# Patient Record
Sex: FEMALE | Race: WHITE | Employment: FULL TIME | URBAN - METROPOLITAN AREA
[De-identification: names, ages, dates, MRNs, and addresses within clinical notes are randomized per-mention and may not be internally consistent; named-entity substitution may affect disease eponyms.]

---

## 2017-02-17 NOTE — TELEPHONE ENCOUNTER
Recevied request for refill from [facility] for [place]esta Thyroid would you like to refill

## 2017-02-19 RX ORDER — LEVOTHYROXINE AND LIOTHYRONINE 38; 9 UG/1; UG/1
TABLET ORAL
Qty: 90 TAB | Refills: 3 | Status: SHIPPED | OUTPATIENT
Start: 2017-02-19 | End: 2021-10-25

## 2017-02-23 ENCOUNTER — TELEPHONE (OUTPATIENT)
Dept: NEUROLOGY | Age: 47
End: 2017-02-23

## 2017-02-24 NOTE — TELEPHONE ENCOUNTER
Called the patient back and asked what pharm she wanted the scipt to go to, patient advised that she wanted it to go to [facility] on [address] the number provided was [phone]

## 2017-04-07 RX ORDER — ESOMEPRAZOLE MAGNESIUM 40 MG/1
CAPSULE, DELAYED RELEASE ORAL
Refills: 5 | COMMUNITY
Start: 2017-03-11 | End: 2017-04-07 | Stop reason: SDUPTHER

## 2017-04-07 NOTE — TELEPHONE ENCOUNTER
No future appointments.                           Please refill below    Requested Prescriptions     Pending Prescriptions Disp Refills    esomeprazole (NEXIUM) 40 mg capsule 30 Cap 5     Sig: Take one capsule by mouth every day

## 2017-04-10 RX ORDER — ESOMEPRAZOLE MAGNESIUM 40 MG/1
CAPSULE, DELAYED RELEASE ORAL
Qty: 30 CAP | Refills: 5 | Status: SHIPPED | OUTPATIENT
Start: 2017-04-10 | End: 2017-04-13 | Stop reason: SDUPTHER

## 2017-04-13 NOTE — TELEPHONE ENCOUNTER
No future appointments.                       Last Appointment My Department:  Visit date not found    Please refill     Requested Prescriptions     Pending Prescriptions Disp Refills    esomeprazole (NEXIUM) 40 mg capsule 30 Cap 5     Sig: Take one capsule by mouth every day

## 2017-04-14 RX ORDER — ESOMEPRAZOLE MAGNESIUM 40 MG/1
CAPSULE, DELAYED RELEASE ORAL
Qty: 30 CAP | Refills: 5 | Status: SHIPPED | OUTPATIENT
Start: 2017-04-14 | End: 2018-03-30 | Stop reason: SDUPTHER

## 2017-04-26 ENCOUNTER — OFFICE VISIT (OUTPATIENT)
Dept: NEUROLOGY | Age: 47
End: 2017-04-26

## 2017-04-26 VITALS
WEIGHT: 209 LBS | OXYGEN SATURATION: 98 % | HEART RATE: 94 BPM | DIASTOLIC BLOOD PRESSURE: 72 MMHG | SYSTOLIC BLOOD PRESSURE: 110 MMHG

## 2017-04-26 DIAGNOSIS — Z51.81 ANTICOAGULATION GOAL OF INR 2.0 TO 2.5: ICD-10-CM

## 2017-04-26 DIAGNOSIS — Z79.01 ANTICOAGULATION GOAL OF INR 2.0 TO 2.5: ICD-10-CM

## 2017-04-26 DIAGNOSIS — I77.74 VERTEBRAL ARTERY DISSECTION (HCC): Primary | ICD-10-CM

## 2017-04-26 DIAGNOSIS — E03.9 ACQUIRED HYPOTHYROIDISM: ICD-10-CM

## 2017-04-26 DIAGNOSIS — G25.81 RESTLESS LEG SYNDROME: ICD-10-CM

## 2017-04-26 RX ORDER — HYDROCODONE BITARTRATE AND ACETAMINOPHEN 10; 325 MG/1; MG/1
1 TABLET ORAL
Qty: 20 TAB | Refills: 0 | Status: SHIPPED | OUTPATIENT
Start: 2017-04-26 | End: 2017-09-06 | Stop reason: ALTCHOICE

## 2017-04-26 RX ORDER — VENLAFAXINE HYDROCHLORIDE 150 MG/1
CAPSULE, EXTENDED RELEASE ORAL DAILY
COMMUNITY
End: 2018-06-01 | Stop reason: SDUPTHER

## 2017-04-26 RX ORDER — ATORVASTATIN CALCIUM 40 MG/1
TABLET, FILM COATED ORAL DAILY
COMMUNITY
End: 2017-07-13 | Stop reason: SDUPTHER

## 2017-04-26 RX ORDER — WARFARIN SODIUM 5 MG/1
8 TABLET ORAL DAILY
COMMUNITY

## 2017-04-26 NOTE — MR AVS SNAPSHOT
Visit Information Date & Time Provider Department Dept. Phone Encounter #  
 4/26/2017  9:40 AM Estela Dickey MD [facility] [phone] [id number] Follow-up Instructions Return in about 4 months (around 8/26/2017). Upcoming Health Maintenance Date Due DTaP/Tdap/Td series (1 - Tdap) 7/15/1991 PAP AKA CERVICAL CYTOLOGY 7/15/1991 INFLUENZA AGE 9 TO ADULT 8/1/2016 Allergies as of 4/26/2017  Review Complete On: 4/26/2017 By: Karo Rivers Not on File Current Immunizations  Never Reviewed No immunizations on file. Not reviewed this visit You Were Diagnosed With   
  
 Codes Comments Vertebral artery dissection (HCC)    -  Primary ICD-10-CM: I77.74 ICD-9-CM: 443.24 Restless leg syndrome     ICD-10-CM: G25.81 ICD-9-CM: 333.94 Acquired hypothyroidism     ICD-10-CM: E03.9 ICD-9-CM: 244.9 Anticoagulation goal of INR 2.0 to 2.5     ICD-10-CM: Z51.81, Z79.01 
ICD-9-CM: V58.83, V58.61 Vitals BP Pulse Weight(growth percentile) SpO2 Smoking Status 110/72 94 209 lb (94.8 kg) 98% Current Some Day Smoker Preferred Pharmacy Pharmacy Name Phone [id number] [address] [phone] Your Updated Medication List  
  
   
This list is accurate as of: 4/26/17 10:44 AM.  Always use your most recent med list.  
  
  
  
  
 EFFEXOR  mg capsule Generic drug:  venlafaxine-SR Take  by mouth daily. esomeprazole 40 mg capsule Commonly known as:  Lizzy Leyva Take one capsule by mouth every day  
  
 * HYDROcodone-acetaminophen  mg tablet Commonly known as:  [place] Take 1 Tab by mouth nightly as needed for Pain. Max Daily Amount: 1 Tab. * HYDROcodone-acetaminophen  mg tablet Commonly known as:  [place] Take 1 Tab by mouth nightly as needed for Pain Leonides Vidales on May 26 2017).  Max Daily Amount: 1 Tab. * HYDROcodone-acetaminophen  mg tablet Commonly known as:  [place] L[place] Take 1 Tab by mouth nightly as needed for Pain Augustus Franck on June 25 2017). Max Daily Amount: 1 Tab. LIPITOR 40 mg tablet Generic drug:  atorvastatin Take  by mouth daily. thyroid (Pork) 60 mg tablet Commonly known as:  ARMOUR THYROID Take 2 and 1/2 tablets by mouth in the morning  
  
 warfarin 5 mg tablet Commonly known as:  COUMADIN Take 5 mg by mouth daily. * Notice: This list has 3 medication(s) that are the same as other medications prescribed for you. Read the directions carefully, and ask your doctor or other care provider to review them with you. Prescriptions Printed Refills HYDROcodone-acetaminophen (NORCO)  mg tablet 0 Sig: Take 1 Tab by mouth nightly as needed for Pain. Max Daily Amount: 1 Tab. Class: Print Route: Oral  
 HYDROcodone-acetaminophen (NORCO)  mg tablet 0 Sig: Take 1 Tab by mouth nightly as needed for Pain Augustus Franck on May 26 2017). Max Daily Amount: 1 Tab. Class: Print Route: Oral  
 HYDROcodone-acetaminophen (NORCO)  mg tablet 0 Sig: Take 1 Tab by mouth nightly as needed for Pain Augustus Franck on June 25 2017). Max Daily Amount: 1 Tab. Class: Print Route: Oral  
  
Follow-up Instructions Return in about 4 months (around 8/26/2017). Patient Instructions A Healthy Lifestyle: Care Instructions Your Care Instructions A healthy lifestyle can help you feel good, stay at a healthy weight, and have plenty of energy for both work and play. A healthy lifestyle is something you can share with your whole family. A healthy lifestyle also can lower your risk for serious health problems, such as high blood pressure, heart disease, and diabetes. You can follow a few steps listed below to improve your health and the health of your family. Follow-up care is a key part of your treatment and safety. Be sure to make and go to all appointments, and call your doctor if you are having problems. Its also a good idea to know your test results and keep a list of the medicines you take. How can you care for yourself at home? · Do not eat too much sugar, fat, or fast foods. You can still have dessert and treats now and then. The goal is moderation. · Start small to improve your eating habits. Pay attention to portion sizes, drink less juice and soda pop, and eat more fruits and vegetables. ¨ Eat a healthy amount of food. A 3-ounce serving of meat, for example, is about the size of a deck of cards. Fill the rest of your plate with vegetables and whole grains. ¨ Limit the amount of soda and sports drinks you have every day. Drink more water when you are thirsty. ¨ Eat at least 5 servings of fruits and vegetables every day. It may seem like a lot, but it is not hard to reach this goal. A serving or helping is 1 piece of fruit, 1 cup of vegetables, or 2 cups of leafy, raw vegetables. Have an apple or some carrot sticks as an afternoon snack instead of a candy bar. Try to have fruits and/or vegetables at every meal. 
· Make exercise part of your daily routine. You may want to start with simple activities, such as walking, bicycling, or slow swimming. Try to be active 30 to 60 minutes every day. You do not need to do all 30 to 60 minutes all at once. For example, you can exercise 3 times a day for 10 or 20 minutes. Moderate exercise is safe for most people, but it is always a good idea to talk to your doctor before starting an exercise program. 
· Keep moving. [place]aca Sauce the lawn, work in the garden, or LOVEFiLM. Take the stairs instead of the elevator at work. · If you smoke, quit. People who smoke have an increased risk for heart attack, stroke, cancer, and other lung illnesses.  Quitting is hard, but there are ways to boost your chance of quitting tobacco for good. ¨ Use nicotine gum, patches, or lozenges. ¨ Ask your doctor about stop-smoking programs and medicines. ¨ Keep trying. In addition to reducing your risk of diseases in the future, you will notice some benefits soon after you stop using tobacco. If you have shortness of breath or asthma symptoms, they will likely get better within a few weeks after you quit. · Limit how much alcohol you drink. Moderate amounts of alcohol (up to 2 drinks a day for men, 1 drink a day for women) are okay. But drinking too much can lead to liver problems, high blood pressure, and other health problems. Family health If you have a family, there are many things you can do together to improve your health. · Eat meals together as a family as often as possible. · Eat healthy foods. This includes fruits, vegetables, lean meats and dairy, and whole grains. · Include your family in your fitness plan. Most people think of activities such as jogging or tennis as the way to fitness, but there are many ways you and your family can be more active. Anything that makes you breathe hard and gets your heart pumping is exercise. Here are some tips: 
¨ Walk to do errands or to take your child to school or the bus. ¨ Go for a family bike ride after dinner instead of watching TV. Where can you learn more? Go to http://elsa-sarah.info/. Enter G216 in the search box to learn more about \"A Healthy Lifestyle: Care Instructions. \" Current as of: July 26, 2016 Content Version: 11.2 © 1701-4088 [employer]. Care instructions adapted under license by [employer] (which disclaims liability or warranty for this information). If you have questions about a medical condition or this instruction, always ask your healthcare professional. [place] [address] any warranty or liability for your use of this information. Introducing [facility]! Bastian Part introduces [facility] patient portal. Now you can access parts of your medical record, email your doctor's office, and request medication refills online. 1. In your internet browser, go to [email or web address] 2. Click on the First Time User? Click Here link in the Sign In box. You will see the New Member Sign Up page. 3. Enter your [employer] Access Code exactly as it appears below. You will not need to use this code after youve completed the sign-up process. If you do not sign up before the expiration date, you must request a new code. · [facility] Access Code: [email or web address] Expires: 7/25/2017  9:17 AM 
 
4. Enter the last four digits of your Social Security Number (xxxx) and Date of Birth (mm/dd/yyyy) as indicated and click Submit. You will be taken to the next sign-up page. 5. Create a [facility] ID. This will be your [facility] login ID and cannot be changed, so think of one that is secure and easy to remember. 6. Create a [employer] password. You can change your password at any time. 7. Enter your Password Reset Question and Answer. This can be used at a later time if you forget your password. 8. Enter your e-mail address. You will receive e-mail notification when new information is available in [address] 9. Click Sign Up. You can now view and download portions of your medical record. 10. Click the Download Summary menu link to download a portable copy of your medical information. If you have questions, please visit the Frequently Asked Questions section of the [employer] website. Remember, [facility] is NOT to be used for urgent needs. For medical emergencies, dial 911. Now available from your iPhone and Android! Please provide this summary of care documentation to your next provider. Your primary care clinician is listed as PROVIDER UNKNOWN. If you have any questions after today's visit, please call [phone].
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
## 2017-04-26 NOTE — PATIENT INSTRUCTIONS

## 2017-06-09 RX ORDER — VENLAFAXINE HYDROCHLORIDE 150 MG/1
CAPSULE, EXTENDED RELEASE ORAL DAILY
OUTPATIENT
Start: 2017-06-09

## 2017-06-09 RX ORDER — VENLAFAXINE HYDROCHLORIDE 150 MG/1
1 TABLET, EXTENDED RELEASE ORAL DAILY
Qty: 30 TAB | Refills: 11 | Status: SHIPPED | OUTPATIENT
Start: 2017-06-09 | End: 2017-09-06 | Stop reason: ALTCHOICE

## 2017-07-13 ENCOUNTER — TELEPHONE (OUTPATIENT)
Dept: NEUROLOGY | Age: 47
End: 2017-07-13

## 2017-07-13 DIAGNOSIS — E78.01 FAMILIAL HYPERCHOLESTEROLEMIA: Primary | ICD-10-CM

## 2017-07-13 RX ORDER — ATORVASTATIN CALCIUM 40 MG/1
40 TABLET, FILM COATED ORAL DAILY
Qty: 30 TAB | Refills: 5 | Status: SHIPPED | COMMUNITY
Start: 2017-07-13 | End: 2018-01-15 | Stop reason: SDUPTHER

## 2017-07-13 NOTE — TELEPHONE ENCOUNTER
Requested Prescriptions     Pending Prescriptions Disp Refills    atorvastatin (LIPITOR) 40 mg tablet       Sig: Take  by mouth daily.      Future Appointments  Date Time Provider Kp Sarmiento   9/6/2017 9:40 AM Lore Argueta MD [address]                         Last Appointment My Department:  4/26/2017    Please refill

## 2017-09-06 ENCOUNTER — OFFICE VISIT (OUTPATIENT)
Dept: NEUROLOGY | Age: 47
End: 2017-09-06

## 2017-09-06 VITALS
OXYGEN SATURATION: 99 % | HEART RATE: 83 BPM | DIASTOLIC BLOOD PRESSURE: 64 MMHG | WEIGHT: 213 LBS | BODY MASS INDEX: 39.2 KG/M2 | HEIGHT: 62 IN | SYSTOLIC BLOOD PRESSURE: 110 MMHG

## 2017-09-06 DIAGNOSIS — I10 ESSENTIAL HYPERTENSION: ICD-10-CM

## 2017-09-06 DIAGNOSIS — Z79.01 CHRONIC ANTICOAGULATION: ICD-10-CM

## 2017-09-06 DIAGNOSIS — N80.9 ENDOMETRIOSIS: ICD-10-CM

## 2017-09-06 DIAGNOSIS — M62.838 MUSCLE SPASM: ICD-10-CM

## 2017-09-06 DIAGNOSIS — D50.0 IRON DEFICIENCY ANEMIA DUE TO CHRONIC BLOOD LOSS: ICD-10-CM

## 2017-09-06 DIAGNOSIS — F41.1 GENERALIZED ANXIETY DISORDER: ICD-10-CM

## 2017-09-06 DIAGNOSIS — I77.74 VERTEBRAL ARTERY DISSECTION (HCC): Primary | ICD-10-CM

## 2017-09-06 DIAGNOSIS — I63.9 CEREBELLAR STROKE (HCC): ICD-10-CM

## 2017-09-06 RX ORDER — TIZANIDINE 4 MG/1
4 TABLET ORAL
Qty: 90 TAB | Refills: 3 | Status: SHIPPED | OUTPATIENT
Start: 2017-09-06 | End: 2019-07-26 | Stop reason: SDUPTHER

## 2017-09-06 NOTE — MR AVS SNAPSHOT
Visit Information Date & Time Provider Department Dept. Phone Encounter #  
 9/6/2017  9:40 AM Yasmeen Eli MD [facility] at [facility] [phone] [id number] Follow-up Instructions Return in about 4 months (around 1/6/2018) for STROKE. Upcoming Health Maintenance Date Due Pneumococcal 19-64 Medium Risk (1 of 1 - PPSV23) 7/15/1989 DTaP/Tdap/Td series (1 - Tdap) 7/15/1991 PAP AKA CERVICAL CYTOLOGY 7/15/1991 INFLUENZA AGE 9 TO ADULT 8/1/2017 Allergies as of 9/6/2017  Review Complete On: 9/6/2017 By: Marleni Mosquera Not on File Current Immunizations  Never Reviewed No immunizations on file. Not reviewed this visit You Were Diagnosed With   
  
 Codes Comments Vertebral artery dissection (HCC)    -  Primary ICD-10-CM: I77.74 ICD-9-CM: 443.24 Chronic anticoagulation     ICD-10-CM: Z79.01 
ICD-9-CM: V58.61 Endometriosis     ICD-10-CM: N80.9 ICD-9-CM: 617.9 Cerebellar stroke ([facility] [address]     ICD-10-CM: I63.9 ICD-9-CM: 434.91 Iron deficiency anemia due to chronic blood loss     ICD-10-CM: D50.0 ICD-9-CM: 280.0 Essential hypertension     ICD-10-CM: I10 
ICD-9-CM: 401.9 Generalized anxiety disorder     ICD-10-CM: F41.1 ICD-9-CM: 300.02 Muscle spasm     ICD-10-CM: A39.255 ICD-9-CM: 728.85 Vitals BP Pulse Height(growth percentile) Weight(growth percentile) SpO2 BMI  
 110/64 83 5' 2\" (1.575 m) 213 lb (96.6 kg) 99% 38.96 kg/m2 Smoking Status Current Some Day Smoker Vitals History BMI and BSA Data Body Mass Index Body Surface Area  
 38.96 kg/m 2 2.06 m 2 Preferred Pharmacy Pharmacy Name Phone [id number], [address] [place] [address] [phone] Your Updated Medication List  
  
   
This list is accurate as of: 9/6/17 11:04 AM.  Always use your most recent med list.  
  
  
  
  
 atorvastatin 40 mg tablet Commonly known as:  LIPITOR Take 1 Tab by mouth daily. EFFEXOR  mg capsule Generic drug:  venlafaxine-SR Take  by mouth daily. esomeprazole 40 mg capsule Commonly known as:  Sonda [place] Take one capsule by mouth every day  
  
 thyroid (Pork) 60 mg tablet Commonly known as:  ARMOUR THYROID Take 2 and 1/2 tablets by mouth in the morning  
  
 tiZANidine 4 mg tablet Commonly known as:  Jaunita [place] Take 1 Tab by mouth three (3) times daily as needed. warfarin 5 mg tablet Commonly known as:  COUMADIN Take 5 mg by mouth daily. Prescriptions Sent to Pharmacy Refills  
 tiZANidine (ZANAFLEX) 4 mg tablet 3 Sig: Take 1 Tab by mouth three (3) times daily as needed. Class: Normal  
 Pharmacy: [facility] #[id number] [address] #: [phone] Route: Oral  
  
Follow-up Instructions Return in about 4 months (around 1/6/2018) for STROKE. Patient Instructions A Healthy Lifestyle: Care Instructions Your Care Instructions A healthy lifestyle can help you feel good, stay at a healthy weight, and have plenty of energy for both work and play. A healthy lifestyle is something you can share with your whole family. A healthy lifestyle also can lower your risk for serious health problems, such as high blood pressure, heart disease, and diabetes. You can follow a few steps listed below to improve your health and the health of your family. Follow-up care is a key part of your treatment and safety. Be sure to make and go to all appointments, and call your doctor if you are having problems. Its also a good idea to know your test results and keep a list of the medicines you take. How can you care for yourself at home? · Do not eat too much sugar, fat, or fast foods. You can still have dessert and treats now and then. The goal is moderation. · Start small to improve your eating habits. Pay attention to portion sizes, drink less juice and soda pop, and eat more fruits and vegetables. ¨ Eat a healthy amount of food. A 3-ounce serving of meat, for example, is about the size of a deck of cards. Fill the rest of your plate with vegetables and whole grains. ¨ Limit the amount of soda and sports drinks you have every day. Drink more water when you are thirsty. ¨ Eat at least 5 servings of fruits and vegetables every day. It may seem like a lot, but it is not hard to reach this goal. A serving or helping is 1 piece of fruit, 1 cup of vegetables, or 2 cups of leafy, raw vegetables. Have an apple or some carrot sticks as an afternoon snack instead of a candy bar. Try to have fruits and/or vegetables at every meal. 
· Make exercise part of your daily routine. You may want to start with simple activities, such as walking, bicycling, or slow swimming. Try to be active 30 to 60 minutes every day. You do not need to do all 30 to 60 minutes all at once. For example, you can exercise 3 times a day for 10 or 20 minutes. Moderate exercise is safe for most people, but it is always a good idea to talk to your doctor before starting an exercise program. 
· Keep moving. Harjeet Handsome the lawn, work in the garden, or [employer]. Take the stairs instead of the elevator at work. · If you smoke, quit. People who smoke have an increased risk for heart attack, stroke, cancer, and other lung illnesses. Quitting is hard, but there are ways to boost your chance of quitting tobacco for good. ¨ Use nicotine gum, patches, or lozenges. ¨ Ask your doctor about stop-smoking programs and medicines. ¨ Keep trying. In addition to reducing your risk of diseases in the future, you will notice some benefits soon after you stop using tobacco. If you have shortness of breath or asthma symptoms, they will likely get better within a few weeks after you quit. · Limit how much alcohol you drink. Moderate amounts of alcohol (up to 2 drinks a day for men, 1 drink a day for women) are okay. But drinking too much can lead to liver problems, high blood pressure, and other health problems. Family health If you have a family, there are many things you can do together to improve your health. · Eat meals together as a family as often as possible. · Eat healthy foods. This includes fruits, vegetables, lean meats and dairy, and whole grains. · Include your family in your fitness plan. Most people think of activities such as jogging or tennis as the way to fitness, but there are many ways you and your family can be more active. Anything that makes you breathe hard and gets your heart pumping is exercise. Here are some tips: 
¨ Walk to do errands or to take your child to school or the bus. ¨ Go for a family bike ride after dinner instead of watching TV. Where can you learn more? Go to http://elsa-sarah.info/. Enter R555 in the search box to learn more about \"A Healthy Lifestyle: Care Instructions. \" Current as of: July 26, 2016 Content Version: 11.3 © 4199-8778 [employer]. Care instructions adapted under license by [employer] (which disclaims liability or warranty for this information). If you have questions about a medical condition or this instruction, always ask your healthcare professional. [place] [address] any warranty or liability for your use of this information. PRESCRIPTION REFILL POLICY [facility] Statement to Patients April 1, 2014 In an effort to ensure the large volume of patient prescription refills is processed in the most efficient and expeditious manner, we are asking our patients to assist us by calling your Pharmacy for all prescription refills, this will include also your  Mail Order Pharmacy.  The pharmacy will contact our office electronically to continue the refill process. Please do not wait until the last minute to call your pharmacy. We need at least 48 hours (2days) to fill prescriptions. We also encourage you to call your pharmacy before going to  your prescription to make sure it is ready. With regard to controlled substance prescription refill requests (narcotic refills) that need to be picked up at our office, we ask your cooperation by providing us with at least 72 hours (3days) notice that you will need a refill. We will not refill narcotic prescription refill requests after 4:00pm on any weekday, Monday through Thursday, or after 2:00pm on Fridays, or on the weekends. We encourage everyone to explore another way of getting your prescription refill request processed using "[employer] our patient web portal through our electronic medical record system. "[employer] is an efficient and effective way to communicate your medication request directly to the office and  downloadable as an bhavin on your smart phone . "[employer] also features a review functionality that allows you to view your medication list as well as leave messages for your physician. Are you ready to get connected? If so please review the attatched instructions or speak to any of our staff to get you set up right away! Thank you so much for your cooperation. Should you have any questions please contact our Practice Administrator. The Physicians and Staff,  [facility]! [facility] introduces "[employer] patient portal. Now you can access parts of your medical record, email your doctor's office, and request medication refills online. 1. In your internet browser, go to [email or web address] 2U/[employer] 2. Click on the First Time User? Click Here link in the Sign In box. You will see the New Member Sign Up page. 3. Enter your Max-Viz Access Code exactly as it appears below. You will not need to use this code after youve completed the sign-up process. If you do not sign up before the expiration date, you must request a new code. · Max-Viz Access Code: [email or web address] Expires: 12/5/2017 11:00 AM 
 
4. Enter the last four digits of your Social Security Number (xxxx) and Date of Birth (mm/dd/yyyy) as indicated and click Submit. You will be taken to the next sign-up page. 5. Create a Max-Viz ID. This will be your Max-Viz login ID and cannot be changed, so think of one that is secure and easy to remember. 6. Create a Max[employer] password. You can change your password at any time. 7. Enter your Password Reset Question and Answer. This can be used at a later time if you forget your password. 8. Enter your e-mail address. You will receive e-mail notification when new information is available in [address]. 9. Click Sign Up. You can now view and download portions of your medical record. 10. Click the Download Summary menu link to download a portable copy of your medical information. If you have questions, please visit the Frequently Asked Questions section of the [employer] website. Remember, [employer] is NOT to be used for urgent needs. For medical emergencies, dial 911. Now available from your iPhone and Android! Please provide this summary of care documentation to your next provider. Your primary care clinician is listed as PROVIDER UNKNOWN. If you have any questions after today's visit, please call [phone].

## 2017-09-06 NOTE — PROGRESS NOTES
Chief Complaint: Vertebral artery dissection    Returns for followup. Compliant with medications. The new weakness or sensory loss. No new dizziness. Continues to suffer from restless leg syndrome but this is related to low ferritin levels. She is scheduled to see the hematologist in the near future. Has not suffered any excessive bleeding we discussed the prospect of getting a hysterectomy on account of her heavy periods discussed the risks involved and potential benefits and after weighing these decided against surgery. Assesment and Plan  1. Vertebral artery dissection (HCC)  Continue Coumadin and maintain INR between 2.5 and 3.5.    2. Restless leg syndrome  Hydrocodone and a trial of Horizant    3. Acquired hypothyroidism  Continue Cedar Thyroid    4. Anticoagulation goal of INR 2.0 to 2.5  Continue Coumadin. 5. Hyperlipidemia  Continue lipitor. Allergies  Review of patient's allergies indicates not on file. Medications  Current Outpatient Prescriptions   Medication Sig    atorvastatin (LIPITOR) 40 mg tablet Take 1 Tab by mouth daily.  Venlafaxine 150 mg tr24 Take 1 Tab by mouth daily.  venlafaxine-SR (EFFEXOR XR) 150 mg capsule Take  by mouth daily.  warfarin (COUMADIN) 5 mg tablet Take 5 mg by mouth daily.  esomeprazole (NEXIUM) 40 mg capsule Take one capsule by mouth every day    thyroid, Pork, (ARMOUR THYROID) 60 mg tablet Take 2 and 1/2 tablets by mouth in the morning    HYDROcodone-acetaminophen (NORCO)  mg tablet Take 1 Tab by mouth nightly as needed for Pain. Max Daily Amount: 1 Tab.  HYDROcodone-acetaminophen (NORCO)  mg tablet Take 1 Tab by mouth nightly as needed for Pain Kali Press on May 26 2017). Max Daily Amount: 1 Tab.  HYDROcodone-acetaminophen (NORCO)  mg tablet Take 1 Tab by mouth nightly as needed for Pain Kali Press on June 25 2017). Max Daily Amount: 1 Tab.  gabapentin enacarbil (HORIZANT) 600 mg TbER Take 600 mg by mouth nightly.      No current facility-administered medications for this visit. Medical History  Past Medical History:   Diagnosis Date    Anemia     Anxiety disorder     Endometriosis 06/12/2017    Hearing reduced     Joint pain     Memory disorder     Migraine 2008    Nausea & vomiting     Poor appetite     Snoring     Stroke [facility]) 2008    Thyroid disease 1988       Review of Systems   Constitutional: Positive for malaise/fatigue. Negative for chills and fever. HENT: Negative for ear pain. Eyes: Negative for pain and discharge. Respiratory: Negative for cough and hemoptysis. Cardiovascular: Negative for chest pain and claudication. Gastrointestinal: Negative for constipation and diarrhea. Genitourinary: Negative for flank pain and hematuria. Musculoskeletal: Positive for myalgias. Negative for back pain. Skin: Negative for itching and rash. Neurological: Negative for headaches. Endo/Heme/Allergies: Negative for environmental allergies. Does not bruise/bleed easily. Psychiatric/Behavioral: Negative for depression and hallucinations. The patient is nervous/anxious and has insomnia. Exam:    Visit Vitals    /64    Pulse 83    Ht 5' 2\" (1.575 m)    Wt 213 lb (96.6 kg)    SpO2 99%    BMI 38.96 kg/m2      Gen Appearance: Well built no apparent distress  HEENT : Normocephalic atraumatic with anicteric sclera  Neck: Supple with no thyromegaly   Chest: Clear to auscultation, no wheezes or rubs  CardioVascular: Regular in rhythm and rate with no murmurs  Carotids no bruit  No pedal edema  Abdomen: Soft non tender, distended with normal bowel sounds  Ext: Full range of motion  Skin: Supple, No rash    Neuro:  Awake alert and oriented to person and place and time  Recent and remote memory is intact  Speech: No aphasia and no dysarthria, intact repetition.    Cranial Nerves: Symmetric facial movement  Intact facial sensation  Hearing intact bilaterally, No nystagmus  Intact bilateral gag reflex  Shoulder shrug is symmetric with no weakness  Tongue midline on protrusion  Eyes: Full bilateral visual fields by confrontation. PERRLA EOMI   Motor: 5/5 in all major muscle groups   No tremors  Normal tone, no cogwheel rigidity  Reflexes: 2+ over biceps, triceps and brachioradialis tendons    3+ over the patellar and achilles tendons  Sensory: Intact to all modalities in both proximal and distal distributions  Coordination: Finger to nose and heel over shin to knee intact on both sides  Gait: Well balanced with normal arm swing    Imaging    CT Results (most recent):    Results from Hospital Encounter encounter on 11/16/11   CT HEAD WITHOUT CONTRAST   Narrative **Final Report**      ICD Codes / Adm. Diagnosis: 780.4   / VERTIGO    Examination:  CT HEAD  CON  - [id number] - Nov 16 2011 11:11AM  Accession No:  [id number]  Reason:  vertigo      REPORT:  EXAM:  CT HEAD WITHOUT CONTRAST    INDICATION: Vertigo. COMPARISON: None. CONTRAST: None. TECHNIQUE: Unenhanced CT of the head was performed using 5 mm images. Brain   and bone windows were generated. FINDINGS:  The ventricles and sulci are normal in size, shape and configuration and   midline. There is no significant white matter disease. There is no   intracranial hemorrhage, extra-axial collection, mass, mass effect or   midline shift. The basilar cisterns are open. No acute infarct is   identified. The bone windows demonstrate no abnormalities. The visualized   portions of the paranasal sinuses and mastoid air cells are clear. IMPRESSION: Normal unenhanced CT examination of the head.            Signing/Reading Doctor: Lauren Mcclure ([id number])    Approved: Lauren Mcclure ([id number])  11/16/2011

## 2017-09-06 NOTE — PATIENT INSTRUCTIONS
A Healthy Lifestyle: Care Instructions  Your Care Instructions  A healthy lifestyle can help you feel good, stay at a healthy weight, and have plenty of energy for both work and play. A healthy lifestyle is something you can share with your whole family. A healthy lifestyle also can lower your risk for serious health problems, such as high blood pressure, heart disease, and diabetes. You can follow a few steps listed below to improve your health and the health of your family. Follow-up care is a key part of your treatment and safety. Be sure to make and go to all appointments, and call your doctor if you are having problems. Its also a good idea to know your test results and keep a list of the medicines you take. How can you care for yourself at home? · Do not eat too much sugar, fat, or fast foods. You can still have dessert and treats now and then. The goal is moderation. · Start small to improve your eating habits. Pay attention to portion sizes, drink less juice and soda pop, and eat more fruits and vegetables. ¨ Eat a healthy amount of food. A 3-ounce serving of meat, for example, is about the size of a deck of cards. Fill the rest of your plate with vegetables and whole grains. ¨ Limit the amount of soda and sports drinks you have every day. Drink more water when you are thirsty. ¨ Eat at least 5 servings of fruits and vegetables every day. It may seem like a lot, but it is not hard to reach this goal. A serving or helping is 1 piece of fruit, 1 cup of vegetables, or 2 cups of leafy, raw vegetables. Have an apple or some carrot sticks as an afternoon snack instead of a candy bar. Try to have fruits and/or vegetables at every meal.  · Make exercise part of your daily routine. You may want to start with simple activities, such as walking, bicycling, or slow swimming. Try to be active 30 to 60 minutes every day. You do not need to do all 30 to 60 minutes all at once.  For example, you can exercise 3 times a day for 10 or 20 minutes. Moderate exercise is safe for most people, but it is always a good idea to talk to your doctor before starting an exercise program.  · Keep moving. Hedy Matters the lawn, work in the garden, or [employer]. Take the stairs instead of the elevator at work. · If you smoke, quit. People who smoke have an increased risk for heart attack, stroke, cancer, and other lung illnesses. Quitting is hard, but there are ways to boost your chance of quitting tobacco for good. ¨ Use nicotine gum, patches, or lozenges. ¨ Ask your doctor about stop-smoking programs and medicines. ¨ Keep trying. In addition to reducing your risk of diseases in the future, you will notice some benefits soon after you stop using tobacco. If you have shortness of breath or asthma symptoms, they will likely get better within a few weeks after you quit. · Limit how much alcohol you drink. Moderate amounts of alcohol (up to 2 drinks a day for men, 1 drink a day for women) are okay. But drinking too much can lead to liver problems, high blood pressure, and other health problems. Family health  If you have a family, there are many things you can do together to improve your health. · Eat meals together as a family as often as possible. · Eat healthy foods. This includes fruits, vegetables, lean meats and dairy, and whole grains. · Include your family in your fitness plan. Most people think of activities such as jogging or tennis as the way to fitness, but there are many ways you and your family can be more active. Anything that makes you breathe hard and gets your heart pumping is exercise. Here are some tips:  ¨ Walk to do errands or to take your child to school or the bus. ¨ Go for a family bike ride after dinner instead of watching TV. Where can you learn more? Go to http://elsa-sarah.info/. Enter M727 in the search box to learn more about \"A Healthy Lifestyle: Care Instructions. \"  Current as of: July 26, 2016  Content Version: 11.3  © 0163-1248 [employer]. Care instructions adapted under license by [employer] (which disclaims liability or warranty for this information). If you have questions about a medical condition or this instruction, always ask your healthcare professional. [address] any warranty or liability for your use of this information. [facility]   Statement to Patients  April 1, 2014      In an effort to ensure the large volume of patient prescription refills is processed in the most efficient and expeditious manner, we are asking our patients to assist us by calling your Pharmacy for all prescription refills, this will include also your  Mail Order Pharmacy. The pharmacy will contact our office electronically to continue the refill process. Please do not wait until the last minute to call your pharmacy. We need at least 48 hours (2days) to fill prescriptions. We also encourage you to call your pharmacy before going to  your prescription to make sure it is ready. With regard to controlled substance prescription refill requests (narcotic refills) that need to be picked up at our office, we ask your cooperation by providing us with at least 72 hours (3days) notice that you will need a refill. We will not refill narcotic prescription refill requests after 4:00pm on any weekday, Monday through Thursday, or after 2:00pm on Fridays, or on the weekends. We encourage everyone to explore another way of getting your prescription refill request processed using [employer], our patient web portal through our electronic medical record system. [employer] is an efficient and effective way to communicate your medication request directly to the office and  downloadable as an bhavin on your smart phone .  [employer] also features a review functionality that allows you to view your medication list as well as leave messages for your physician. Are you ready to get connected? If so please review the attatched instructions or speak to any of our staff to get you set up right away! Thank you so much for your cooperation. Should you have any questions please contact our Practice Administrator.     The Physicians and Staff,  [facility]

## 2017-11-29 ENCOUNTER — DOCUMENTATION ONLY (OUTPATIENT)
Dept: NEUROLOGY | Age: 47
End: 2017-11-29

## 2017-12-27 ENCOUNTER — OFFICE VISIT (OUTPATIENT)
Dept: NEUROLOGY | Age: 47
End: 2017-12-27

## 2017-12-27 VITALS
BODY MASS INDEX: 39.12 KG/M2 | DIASTOLIC BLOOD PRESSURE: 82 MMHG | SYSTOLIC BLOOD PRESSURE: 122 MMHG | OXYGEN SATURATION: 98 % | HEART RATE: 95 BPM | WEIGHT: 212.6 LBS | HEIGHT: 62 IN

## 2017-12-27 DIAGNOSIS — Z79.01 ANTICOAGULATION GOAL OF INR 2.0 TO 2.5: ICD-10-CM

## 2017-12-27 DIAGNOSIS — I77.74 VERTEBRAL ARTERY DISSECTION (HCC): Primary | ICD-10-CM

## 2017-12-27 DIAGNOSIS — G25.81 RESTLESS LEG SYNDROME: ICD-10-CM

## 2017-12-27 DIAGNOSIS — Z51.81 ANTICOAGULATION GOAL OF INR 2.0 TO 2.5: ICD-10-CM

## 2017-12-27 DIAGNOSIS — I63.9 CEREBELLAR STROKE (HCC): ICD-10-CM

## 2017-12-27 DIAGNOSIS — E78.49 FAMILIAL HYPERLIPIDEMIA: ICD-10-CM

## 2017-12-27 RX ORDER — FUROSEMIDE 20 MG/1
TABLET ORAL
Refills: 5 | COMMUNITY
Start: 2017-12-10 | End: 2021-10-25

## 2017-12-27 RX ORDER — DICYCLOMINE HYDROCHLORIDE 20 MG/1
TABLET ORAL
Refills: 0 | COMMUNITY
Start: 2017-11-19 | End: 2019-02-19 | Stop reason: ALTCHOICE

## 2017-12-27 RX ORDER — MECLIZINE HCL 12.5 MG 12.5 MG/1
TABLET ORAL
Refills: 0 | COMMUNITY
Start: 2017-12-12 | End: 2019-02-19 | Stop reason: ALTCHOICE

## 2017-12-27 NOTE — PATIENT INSTRUCTIONS
[facility]   Statement to Patients  April 1, 2014      In an effort to ensure the large volume of patient prescription refills is processed in the most efficient and expeditious manner, we are asking our patients to assist us by calling your Pharmacy for all prescription refills, this will include also your  Mail Order Pharmacy. The pharmacy will contact our office electronically to continue the refill process. Please do not wait until the last minute to call your pharmacy. We need at least 48 hours (2days) to fill prescriptions. We also encourage you to call your pharmacy before going to  your prescription to make sure it is ready. With regard to controlled substance prescription refill requests (narcotic refills) that need to be picked up at our office, we ask your cooperation by providing us with at least 72 hours (3days) notice that you will need a refill. We will not refill narcotic prescription refill requests after 4:00pm on any weekday, Monday through Thursday, or after 2:00pm on Fridays, or on the weekends. We encourage everyone to explore another way of getting your prescription refill request processed using [employer], our patient web portal through our electronic medical record system. [employer] is an efficient and effective way to communicate your medication request directly to the office and  downloadable as an bhavin on your smart phone . [employer] also features a review functionality that allows you to view your medication list as well as leave messages for your physician. Are you ready to get connected? If so please review the attatched instructions or speak to any of our staff to get you set up right away! Thank you so much for your cooperation. Should you have any questions please contact our Practice Administrator.     The Physicians and Staff,  [address] [place] [address] Neurology Clinic     Please be advised there is a $25 fee for all paperwork to be completed from our  providers. This is to be paid by the patient prior to picking up the completed forms. A Healthy Lifestyle: Care Instructions  Your Care Instructions    A healthy lifestyle can help you feel good, stay at a healthy weight, and have plenty of energy for both work and play. A healthy lifestyle is something you can share with your whole family. A healthy lifestyle also can lower your risk for serious health problems, such as high blood pressure, heart disease, and diabetes. You can follow a few steps listed below to improve your health and the health of your family. Follow-up care is a key part of your treatment and safety. Be sure to make and go to all appointments, and call your doctor if you are having problems. It's also a good idea to know your test results and keep a list of the medicines you take. How can you care for yourself at home? · Do not eat too much sugar, fat, or fast foods. You can still have dessert and treats now and then. The goal is moderation. · Start small to improve your eating habits. Pay attention to portion sizes, drink less juice and soda pop, and eat more fruits and vegetables. ¨ Eat a healthy amount of food. A 3-ounce serving of meat, for example, is about the size of a deck of cards. Fill the rest of your plate with vegetables and whole grains. ¨ Limit the amount of soda and sports drinks you have every day. Drink more water when you are thirsty. ¨ Eat at least 5 servings of fruits and vegetables every day. It may seem like a lot, but it is not hard to reach this goal. A serving or helping is 1 piece of fruit, 1 cup of vegetables, or 2 cups of leafy, raw vegetables. Have an apple or some carrot sticks as an afternoon snack instead of a candy bar. Try to have fruits and/or vegetables at every meal.  · Make exercise part of your daily routine. You may want to start with simple activities, such as walking, bicycling, or slow swimming.  Try to be active 30 to 60 minutes every day. You do not need to do all 30 to 60 minutes all at once. For example, you can exercise 3 times a day for 10 or 20 minutes. Moderate exercise is safe for most people, but it is always a good idea to talk to your doctor before starting an exercise program.  · Keep moving. [place] the lawn, work in the garden, or [employer]. Take the stairs instead of the elevator at work. · If you smoke, quit. People who smoke have an increased risk for heart attack, stroke, cancer, and other lung illnesses. Quitting is hard, but there are ways to boost your chance of quitting tobacco for good. ¨ Use nicotine gum, patches, or lozenges. ¨ Ask your doctor about stop-smoking programs and medicines. ¨ Keep trying. In addition to reducing your risk of diseases in the future, you will notice some benefits soon after you stop using tobacco. If you have shortness of breath or asthma symptoms, they will likely get better within a few weeks after you quit. · Limit how much alcohol you drink. Moderate amounts of alcohol (up to 2 drinks a day for men, 1 drink a day for women) are okay. But drinking too much can lead to liver problems, high blood pressure, and other health problems. Family health  If you have a family, there are many things you can do together to improve your health. · Eat meals together as a family as often as possible. · Eat healthy foods. This includes fruits, vegetables, lean meats and dairy, and whole grains. · Include your family in your fitness plan. Most people think of activities such as jogging or tennis as the way to fitness, but there are many ways you and your family can be more active. Anything that makes you breathe hard and gets your heart pumping is exercise. Here are some tips:  ¨ Walk to do errands or to take your child to school or the bus. ¨ Go for a family bike ride after dinner instead of watching TV. Where can you learn more?   Go to http://elsa-sarah.info/. Enter R351 in the search box to learn more about \"A Healthy Lifestyle: Care Instructions. \"  Current as of: May 12, 2017  Content Version: 11.4  © 7954-1048 Healthwise, [employer]. Care instructions adapted under license by [employer] (which disclaims liability or warranty for this information). If you have questions about a medical condition or this instruction, always ask your healthcare professional. [address] any warranty or liability for your use of this information.

## 2017-12-27 NOTE — MR AVS SNAPSHOT
Visit Information Date & Time Provider Department Dept. Phone Encounter #  
 12/27/2017 10:20 AM Santos Blankenship MD Neurology Clinic at [facility] [phone] [id number] Follow-up Instructions Return in about 6 months (around 6/27/2018) for STROKE. Upcoming Health Maintenance Date Due Pneumococcal 19-64 Medium Risk (1 of 1 - PPSV23) 7/15/1989 DTaP/Tdap/Td series (1 - Tdap) 7/15/1991 PAP AKA CERVICAL CYTOLOGY 7/15/1991 Influenza Age 5 to Adult 8/1/2017 Allergies as of 12/27/2017  Review Complete On: 12/27/2017 By: Santos Blankenship MD  
 Not on File Current Immunizations  Never Reviewed No immunizations on file. Not reviewed this visit You Were Diagnosed With   
  
 Codes Comments Vertebral artery dissection (HCC)    -  Primary ICD-10-CM: I77.74 ICD-9-CM: 443.24 [facility]     ICD-10-CM: I63.9 ICD-9-CM: 434.91 Restless leg syndrome     ICD-10-CM: G25.81 ICD-9-CM: 333.94 Anticoagulation goal of INR 2.0 to 2.5     ICD-10-CM: Z51.81, Z79.01 
ICD-9-CM: V58.83, V58.61 Familial hyperlipidemia     ICD-10-CM: E78.4 ICD-9-CM: 272.4 Vitals BP Pulse Height(growth percentile) Weight(growth percentile) SpO2 BMI  
 122/82 95 5' 2\" (1.575 m) 212 lb 9.6 oz (96.4 kg) 98% 38.89 kg/m2 Smoking Status Current Some Day Smoker BMI and BSA Data Body Mass Index Body Surface Area  
 38.89 kg/m 2 2.05 m 2 Preferred Pharmacy Pharmacy Name Phone [id number] [address] [phone] Your Updated Medication List  
  
   
This list is accurate as of: 12/27/17 10:55 AM.  Always use your most recent med list.  
  
  
  
  
 atorvastatin 40 mg tablet Commonly known as:  LIPITOR Take 1 Tab by mouth daily. dicyclomine 20 mg tablet Commonly known as:  BENTYL refill. We will not refill narcotic prescription refill requests after 4:00pm on any weekday, Monday through Thursday, or after 2:00pm on Fridays, or on the weekends. We encourage everyone to explore another way of getting your prescription refill request processed using [employer], our patient web portal through our electronic medical record system. [employer] is an efficient and effective way to communicate your medication request directly to the office and  downloadable as an bhavin on your smart phone . [employer] also features a review functionality that allows you to view your medication list as well as leave messages for your physician. Are you ready to get connected? If so please review the attatched instructions or speak to any of our staff to get you set up right away! Thank you so much for your cooperation. Should you have any questions please contact our Practice Administrator. The Physicians and Staff,  [facility] Please be advised there is a $25 fee for all paperwork to be completed from our  providers. This is to be paid by the patient prior to picking up the completed forms. A Healthy Lifestyle: Care Instructions Your Care Instructions A healthy lifestyle can help you feel good, stay at a healthy weight, and have plenty of energy for both work and play. A healthy lifestyle is something you can share with your whole family. A healthy lifestyle also can lower your risk for serious health problems, such as high blood pressure, heart disease, and diabetes. You can follow a few steps listed below to improve your health and the health of your family. Follow-up care is a key part of your treatment and safety. Be sure to make and go to all appointments, and call your doctor if you are having problems. It's also a good idea to know your test results and keep a list of the medicines you take. How can you care for yourself at home? · Do not eat too much sugar, fat, or fast foods. You can still have dessert and treats now and then. The goal is moderation. · Start small to improve your eating habits. Pay attention to portion sizes, drink less juice and soda pop, and eat more fruits and vegetables. ¨ Eat a healthy amount of food. A 3-ounce serving of meat, for example, is about the size of a deck of cards. Fill the rest of your plate with vegetables and whole grains. ¨ Limit the amount of soda and sports drinks you have every day. Drink more water when you are thirsty. ¨ Eat at least 5 servings of fruits and vegetables every day. It may seem like a lot, but it is not hard to reach this goal. A serving or helping is 1 piece of fruit, 1 cup of vegetables, or 2 cups of leafy, raw vegetables. Have an apple or some carrot sticks as an afternoon snack instead of a candy bar. Try to have fruits and/or vegetables at every meal. 
· Make exercise part of your daily routine. You may want to start with simple activities, such as walking, bicycling, or slow swimming. Try to be active 30 to 60 minutes every day. You do not need to do all 30 to 60 minutes all at once. For example, you can exercise 3 times a day for 10 or 20 minutes. Moderate exercise is safe for most people, but it is always a good idea to talk to your doctor before starting an exercise program. 
· Keep moving. [place] [employer] the lawn, work in the garden, or [employer]. Take the stairs instead of the elevator at work. · If you smoke, quit. People who smoke have an increased risk for heart attack, stroke, cancer, and other lung illnesses. Quitting is hard, but there are ways to boost your chance of quitting tobacco for good. ¨ Use nicotine gum, patches, or lozenges. ¨ Ask your doctor about stop-smoking programs and medicines. ¨ Keep trying.  
In addition to reducing your risk of diseases in the future, you will notice some benefits soon after you stop using tobacco. If you have shortness of breath or asthma symptoms, they will likely get better within a few weeks after you quit. · Limit how much alcohol you drink. Moderate amounts of alcohol (up to 2 drinks a day for men, 1 drink a day for women) are okay. But drinking too much can lead to liver problems, high blood pressure, and other health problems. Family health If you have a family, there are many things you can do together to improve your health. · Eat meals together as a family as often as possible. · Eat healthy foods. This includes fruits, vegetables, lean meats and dairy, and whole grains. · Include your family in your fitness plan. Most people think of activities such as jogging or tennis as the way to fitness, but there are many ways you and your family can be more active. Anything that makes you breathe hard and gets your heart pumping is exercise. Here are some tips: 
¨ Walk to do errands or to take your child to school or the bus. ¨ Go for a family bike ride after dinner instead of watching TV. Where can you learn more? Go to http://elsa-sarah.info/. Enter J904 in the search box to learn more about \"A Healthy Lifestyle: Care Instructions. \" Current as of: May 12, 2017 Content Version: 11.4 © 6729-9199 Healthwise, Inc[employer]. Care instructions adapted under license by [employer] (which disclaims liability or warranty for this information). If you have questions about a medical condition or this instruction, always ask your healthcare professional. [place] [address] any warranty or liability for your use of this information. Introducing \[facility]\"" & HEALTH SERVICES! Sebas Garcia introduces [employer] patient portal. Now you can access parts of your medical record, email your doctor's office, and request medication refills online. 1. In your internet browser, go to [email or web address] 2. Click on the First Time User? Click Here link in the Sign In box. You will see the New Member Sign Up page. 3. Enter your [employer] Access Code exactly as it appears below. You will not need to use this code after youve completed the sign-up process. If you do not sign up before the expiration date, you must request a new code. · [employer] Access Code: [email or web address] Expires: 3/27/2018 10:55 AM 
 
4. Enter the last four digits of your Social Security Number (xxxx) and Date of Birth (mm/dd/yyyy) as indicated and click Submit. You will be taken to the next sign-up page. 5. Create a [employer] ID. This will be your [employer] login ID and cannot be changed, so think of one that is secure and easy to remember. 6. Create a [employer] password. You can change your password at any time. 7. Enter your Password Reset Question and Answer. This can be used at a later time if you forget your password. 8. Enter your e-mail address. You will receive e-mail notification when new information is available in [address] 9. Click Sign Up. You can now view and download portions of your medical record. 10. Click the Download Summary menu link to download a portable copy of your medical information. If you have questions, please visit the Frequently Asked Questions section of the [employer] website. Remember, [employer] is NOT to be used for urgent needs. For medical emergencies, dial 911. Now available from your iPhone and Android! Please provide this summary of care documentation to your next provider. Your primary care clinician is listed as PROVIDER UNKNOWN. If you have any questions after today's visit, please call [phone].

## 2017-12-27 NOTE — PROGRESS NOTES
Chief Complaint: Vertebral artery dissection    Returns for followup. Compliant with medications. No new weakness or sensory loss. No new dizziness. Continues to suffer from restless leg syndrome but this is related to low ferritin levels. Has not suffered any excessive bleeding we discussed the prospect of getting a hysterectomy on account of her heavy periods discussed the risks involved and potential benefits and after weighing these decided against surgery. She was seen in the ER for a possible stroke. It was deemed a TIA. She is on aspirin. Assesment and Plan  1. Vertebral artery dissection (HCC)  Continue Coumadin and maintain INR between 2.5 and 3.5.    2. Restless leg syndrome  Hydrocodone and a trial of Horizant    3. Acquired hypothyroidism  Continue [place] Thyroid    4. Anticoagulation goal of INR 2.0 to 2.5  Continue Coumadin. 5. Hyperlipidemia  Continue lipitor. Allergies  Review of patient's allergies indicates not on file. Medications  Current Outpatient Prescriptions   Medication Sig    meclizine (ANTIVERT) 12.5 mg tablet TAKE 1- 2 TABLETS BY MOUTH 3 TIMES A DAY AS NEEDED FOR DIZZINESS    furosemide (LASIX) 20 mg tablet TAKE 1 TABLET BY ORAL ROUTE EVERY DAY    dicyclomine (BENTYL) 20 mg tablet TAKE 1 TABLET BY MOUTH 4 TIMES DAILY AS NEEDED FOR SPASM    atorvastatin (LIPITOR) 40 mg tablet Take 1 Tab by mouth daily.  venlafaxine-SR (EFFEXOR XR) 150 mg capsule Take  by mouth daily.  warfarin (COUMADIN) 5 mg tablet Take 5 mg by mouth daily.  esomeprazole (NEXIUM) 40 mg capsule Take one capsule by mouth every day    thyroid, Pork, (ARMOUR THYROID) 60 mg tablet Take 2 and 1/2 tablets by mouth in the morning    tiZANidine (ZANAFLEX) 4 mg tablet Take 1 Tab by mouth three (3) times daily as needed. No current facility-administered medications for this visit.          Medical History  Past Medical History:   Diagnosis Date    Anemia     Anxiety disorder     Endometriosis 06/12/2017    Hearing reduced     Joint pain     Memory disorder     Migraine 2008    Nausea & vomiting     Poor appetite     Snoring     Stroke [facility]) 2008    Thyroid disease 1988       Review of Systems   Constitutional: Positive for malaise/fatigue. Negative for chills and fever. HENT: Negative for ear pain. Eyes: Negative for pain and discharge. Respiratory: Negative for cough and hemoptysis. Cardiovascular: Negative for chest pain and claudication. Gastrointestinal: Negative for constipation and diarrhea. Genitourinary: Negative for flank pain and hematuria. Musculoskeletal: Positive for myalgias. Negative for back pain. Skin: Negative for itching and rash. Neurological: Negative for headaches. Endo/Heme/Allergies: Negative for environmental allergies. Does not bruise/bleed easily. Psychiatric/Behavioral: Negative for depression and hallucinations. The patient is nervous/anxious and has insomnia. Exam:    Visit Vitals    /82    Pulse 95    Ht 5' 2\" (1.575 m)    Wt 212 lb 9.6 oz (96.4 kg)    SpO2 98%    BMI 38.89 kg/m2      Gen Appearance: Well built no apparent distress  HEENT : Normocephalic atraumatic with anicteric sclera  Neck: Supple with no thyromegaly   Chest: Clear to auscultation, no wheezes or rubs  CardioVascular: Regular in rhythm and rate with no murmurs  Carotids no bruit  No pedal edema  Abdomen: Soft non tender, distended with normal bowel sounds  Ext: Full range of motion  Skin: Supple, No rash    Neuro:  Awake alert and oriented to person and place and time  Recent and remote memory is intact  Speech: No aphasia and no dysarthria, intact repetition. Cranial Nerves: Symmetric facial movement  Intact facial sensation  Hearing intact bilaterally, No nystagmus  Intact bilateral gag reflex  Shoulder shrug is symmetric with no weakness  Tongue midline on protrusion  Eyes: Full bilateral visual fields by confrontation.  PERRLA EOMI   Motor: 5/5 in all major muscle groups   No tremors  Normal tone, no cogwheel rigidity  Reflexes: 2+ over biceps, triceps and brachioradialis tendons    3+ over the patellar and achilles tendons  Sensory: Intact to all modalities in both proximal and distal distributions  Coordination: Finger to nose and heel over shin to knee intact on both sides  Gait: Well balanced with normal arm swing    Imaging    CT Results (most recent):    Results from Hospital Encounter encounter on 11/16/11   CT HEAD WITHOUT CONTRAST   Narrative **Final Report**      ICD Codes / Adm. Diagnosis: 780.4   / VERTIGO    Examination:  CT HEAD  CON  - [id number] - Nov 16 2011 11:11AM  Accession No:  [id number]  Reason:  vertigo      REPORT:  EXAM:  CT HEAD WITHOUT CONTRAST    INDICATION: Vertigo. COMPARISON: None. CONTRAST: None. TECHNIQUE: Unenhanced CT of the head was performed using 5 mm images. Brain   and bone windows were generated. FINDINGS:  The ventricles and sulci are normal in size, shape and configuration and   midline. There is no significant white matter disease. There is no   intracranial hemorrhage, extra-axial collection, mass, mass effect or   midline shift. The basilar cisterns are open. No acute infarct is   identified. The bone windows demonstrate no abnormalities. The visualized   portions of the paranasal sinuses and mastoid air cells are clear. IMPRESSION: Normal unenhanced CT examination of the head.            Signing/Reading Doctor: Melissa Trujillo ([id number])    Approved: Melissa Trujillo ([id number])  11/16/2011

## 2018-01-11 DIAGNOSIS — E78.01 FAMILIAL HYPERCHOLESTEROLEMIA: ICD-10-CM

## 2018-01-11 RX ORDER — ATORVASTATIN CALCIUM 40 MG/1
40 TABLET, FILM COATED ORAL DAILY
Qty: 30 TAB | Refills: 5 | Status: CANCELLED | OUTPATIENT
Start: 2018-01-11

## 2018-01-11 NOTE — TELEPHONE ENCOUNTER
Future Appointments  Date Time Provider Kp Guillermina   6/27/2018 9:40 AM Murtaza Price MD [address]                         Last Appointment My Department:  12/27/2017    Would you like to refill below? Requested Prescriptions     Pending Prescriptions Disp Refills    atorvastatin (LIPITOR) 40 mg tablet 30 Tab 5     Sig: Take 1 Tab by mouth daily.

## 2018-01-12 ENCOUNTER — TELEPHONE (OUTPATIENT)
Dept: NEUROLOGY | Age: 48
End: 2018-01-12

## 2018-01-12 NOTE — TELEPHONE ENCOUNTER
----- Message from Yaya Pa sent at 1/12/2018 12:36 PM EST -----  Regarding: Dr. Danette Bernal stated her pharmacy requested a refill on Rx(\"Atorvastatin\") since Wednesday and no response from the doctor. Pt would like to know if the doctor could call the approval into [facility] on [address]. Pt stated she is out of medication. Best contact number [phone].

## 2018-01-15 DIAGNOSIS — E78.01 FAMILIAL HYPERCHOLESTEROLEMIA: ICD-10-CM

## 2018-01-15 RX ORDER — ATORVASTATIN CALCIUM 40 MG/1
40 TABLET, FILM COATED ORAL DAILY
Qty: 30 TAB | Refills: 5 | Status: SHIPPED | OUTPATIENT
Start: 2018-01-15 | End: 2018-07-11 | Stop reason: SDUPTHER

## 2018-01-15 NOTE — TELEPHONE ENCOUNTER
----- Message from  Blood sent at 1/15/2018 12:53 PM EST -----  Regarding: Dr. Rojas/Telephone/Refill  Pt's pharmacy and the pt has been requesting a refill on her Rx Atorvastatin 40mg and has not heard any response for refilling the pt Rx. The pt is out and needs the medication, [facility] [address], which [facility] on file with the practice, [phone]. Pt best contact number is [phone].

## 2018-01-15 NOTE — TELEPHONE ENCOUNTER
Future Appointments  Date Time Provider Kp Sarmiento   6/27/2018 9:40 AM Duncan Hardin MD [address]                         Last Appointment My Department:  12/27/2017    Would you like to refill below? Patient is out and needs her refill    Requested Prescriptions     Pending Prescriptions Disp Refills    atorvastatin (LIPITOR) 40 mg tablet 30 Tab 5     Sig: Take 1 Tab by mouth daily.

## 2018-03-30 RX ORDER — ESOMEPRAZOLE MAGNESIUM 40 MG/1
CAPSULE, DELAYED RELEASE ORAL
Qty: 30 CAP | Refills: 5 | Status: SHIPPED | OUTPATIENT
Start: 2018-03-30 | End: 2018-10-04 | Stop reason: SDUPTHER

## 2018-03-30 NOTE — TELEPHONE ENCOUNTER
Future Appointments  Date Time Provider Kp Sarmiento   6/27/2018 9:40 AM Teddy Crespo MD [address]                         Last Appointment My Department:  12/27/2017    Would you like to refill below?     Requested Prescriptions     Pending Prescriptions Disp Refills    esomeprazole (NEXIUM) 40 mg capsule 30 Cap 5     Sig: Take one capsule by mouth every day

## 2018-05-23 ENCOUNTER — OFFICE VISIT (OUTPATIENT)
Dept: NEUROLOGY | Age: 48
End: 2018-05-23

## 2018-05-23 VITALS
SYSTOLIC BLOOD PRESSURE: 110 MMHG | HEIGHT: 62 IN | HEART RATE: 95 BPM | BODY MASS INDEX: 38.83 KG/M2 | OXYGEN SATURATION: 99 % | DIASTOLIC BLOOD PRESSURE: 72 MMHG | WEIGHT: 211 LBS

## 2018-05-23 DIAGNOSIS — F41.9 ANXIETY: ICD-10-CM

## 2018-05-23 DIAGNOSIS — I77.74 VERTEBRAL ARTERY DISSECTION (HCC): ICD-10-CM

## 2018-05-23 DIAGNOSIS — I63.9 CEREBELLAR STROKE (HCC): Primary | ICD-10-CM

## 2018-05-23 DIAGNOSIS — D68.62 LUPUS ANTICOAGULANT SYNDROME (HCC): ICD-10-CM

## 2018-05-23 DIAGNOSIS — Z79.01 CHRONIC ANTICOAGULATION: ICD-10-CM

## 2018-05-23 RX ORDER — LANOLIN ALCOHOL/MO/W.PET/CERES
CREAM (GRAM) TOPICAL
Refills: 0 | COMMUNITY
Start: 2018-04-23 | End: 2021-10-25

## 2018-05-23 RX ORDER — ALPRAZOLAM 0.5 MG/1
0.5 TABLET ORAL
Qty: 30 TAB | Refills: 3 | Status: SHIPPED | OUTPATIENT
Start: 2018-05-23 | End: 2019-02-11 | Stop reason: SDUPTHER

## 2018-05-23 NOTE — PATIENT INSTRUCTIONS
Office Policies        Phone calls/patient messages:    Please allow up to 24 hours for someone in the office to contact you about your call or message. Be mindful your provider may be out of the office or your message may require further review. We encourage you to use Scyron for your messages as this is a faster, more efficient way to communicate with our office           Medication Refills:    Prescription medications require up to 48 business hours to process. We encourage you to use Scyron for your refills. For controlled medications: Please allow up to 72 business hours to process. Certain medications may require you to  a written prescription at our office. NO narcotic/controlled medications will be prescribed after 4pm Monday through Friday or on weekends              Form/Paperwork Completion:    Please note there is a $25 fee for all paperwork completed by our providers. We ask that you allow 7-14 business days. Pre-payment is due prior to picking up/faxing the completed form. You may also download your forms to Scyron to have your doctor print off. A Healthy Lifestyle: Care Instructions  Your Care Instructions    A healthy lifestyle can help you feel good, stay at a healthy weight, and have plenty of energy for both work and play. A healthy lifestyle is something you can share with your whole family. A healthy lifestyle also can lower your risk for serious health problems, such as high blood pressure, heart disease, and diabetes. You can follow a few steps listed below to improve your health and the health of your family. Follow-up care is a key part of your treatment and safety. Be sure to make and go to all appointments, and call your doctor if you are having problems. It's also a good idea to know your test results and keep a list of the medicines you take. How can you care for yourself at home? · Do not eat too much sugar, fat, or fast foods.  You can still have dessert and treats now and then. The goal is moderation. · Start small to improve your eating habits. Pay attention to portion sizes, drink less juice and soda pop, and eat more fruits and vegetables. ¨ Eat a healthy amount of food. A 3-ounce serving of meat, for example, is about the size of a deck of cards. Fill the rest of your plate with vegetables and whole grains. ¨ Limit the amount of soda and sports drinks you have every day. Drink more water when you are thirsty. ¨ Eat at least 5 servings of fruits and vegetables every day. It may seem like a lot, but it is not hard to reach this goal. A serving or helping is 1 piece of fruit, 1 cup of vegetables, or 2 cups of leafy, raw vegetables. Have an apple or some carrot sticks as an afternoon snack instead of a candy bar. Try to have fruits and/or vegetables at every meal.  · Make exercise part of your daily routine. You may want to start with simple activities, such as walking, bicycling, or slow swimming. Try to be active 30 to 60 minutes every day. You do not need to do all 30 to 60 minutes all at once. For example, you can exercise 3 times a day for 10 or 20 minutes. Moderate exercise is safe for most people, but it is always a good idea to talk to your doctor before starting an exercise program.  · Keep moving. Earnestn Feeler the lawn, work in the garden, or [employer]. Take the stairs instead of the elevator at work. · If you smoke, quit. People who smoke have an increased risk for heart attack, stroke, cancer, and other lung illnesses. Quitting is hard, but there are ways to boost your chance of quitting tobacco for good. ¨ Use nicotine gum, patches, or lozenges. ¨ Ask your doctor about stop-smoking programs and medicines. ¨ Keep trying.   In addition to reducing your risk of diseases in the future, you will notice some benefits soon after you stop using tobacco. If you have shortness of breath or asthma symptoms, they will likely get better within a few weeks after you quit. · Limit how much alcohol you drink. Moderate amounts of alcohol (up to 2 drinks a day for men, 1 drink a day for women) are okay. But drinking too much can lead to liver problems, high blood pressure, and other health problems. Family health  If you have a family, there are many things you can do together to improve your health. · Eat meals together as a family as often as possible. · Eat healthy foods. This includes fruits, vegetables, lean meats and dairy, and whole grains. · Include your family in your fitness plan. Most people think of activities such as jogging or tennis as the way to fitness, but there are many ways you and your family can be more active. Anything that makes you breathe hard and gets your heart pumping is exercise. Here are some tips:  ¨ Walk to do errands or to take your child to school or the bus. ¨ Go for a family bike ride after dinner instead of watching TV. Where can you learn more? Go to http://elsa-sarah.info/. Enter K327 in the search box to learn more about \"A Healthy Lifestyle: Care Instructions. \"  Current as of: May 12, 2017  Content Version: 11.4  © 2754-9512 Healthwise, Incorporated. Care instructions adapted under license by [employer] (which disclaims liability or warranty for this information). If you have questions about a medical condition or this instruction, always ask your healthcare professional. [place] [address] any warranty or liability for your use of this information.

## 2018-05-23 NOTE — PROGRESS NOTES
Name: Janette Estrada    Chief Complaint:     Patient returns for a follow up visit. She had severe uterine bleeding. She underwent a uterine ablation while on coumadin. She conitnued to bleed and required blood transfusions. She felt she was going to die. Since the procedure she has severel fatigued and depressed. She is not sure she is well. She has no thoughts of suicide. SHe is no longer bleeding and her numbers are recovering. Assesment and Plan  1. Cerebellar stroke ([address]  Remote no new symptoms to suggest extension. 2. Vertebral artery dissection (HCC)  Continue coumadin    3. Chronic anticoagulation  For lupus anticoagualnt    4. Anxiety  - ALPRAZolam (XANAX) 0.5 mg tablet; Take 1 Tab by mouth daily as needed for Anxiety. Indications: Generalized Anxiety Disorder  Dispense: 30 Tab; Refill: 3    5. Lupus anticoagulant  Continue coumadin    Allergies  Review of patient's allergies indicates not on file. Medications  Current Outpatient Prescriptions   Medication Sig    ferrous sulfate 325 mg (65 mg iron) tablet TAKE 1 TABLET BY MOUTH 3 TIMES A DAY    esomeprazole (NEXIUM) 40 mg capsule Take one capsule by mouth every day    atorvastatin (LIPITOR) 40 mg tablet Take 1 Tab by mouth daily.  furosemide (LASIX) 20 mg tablet TAKE 1 TABLET BY ORAL ROUTE EVERY DAY    dicyclomine (BENTYL) 20 mg tablet TAKE 1 TABLET BY MOUTH 4 TIMES DAILY AS NEEDED FOR SPASM    tiZANidine (ZANAFLEX) 4 mg tablet Take 1 Tab by mouth three (3) times daily as needed.  venlafaxine-SR (EFFEXOR XR) 150 mg capsule Take  by mouth daily.  warfarin (COUMADIN) 5 mg tablet Take 5 mg by mouth daily.  thyroid, Pork, (ARMOUR THYROID) 60 mg tablet Take 2 and 1/2 tablets by mouth in the morning    meclizine (ANTIVERT) 12.5 mg tablet TAKE 1- 2 TABLETS BY MOUTH 3 TIMES A DAY AS NEEDED FOR DIZZINESS    gabapentin enacarbil (HORIZANT) 600 mg TbER Take 600 mg by mouth daily.      No current facility-administered medications for this visit. Medical History  Past Medical History:   Diagnosis Date    Anemia     Anxiety disorder     Endometriosis 06/12/2017    Hearing reduced     Joint pain     Memory disorder     Migraine 2008    Nausea & vomiting     Poor appetite     Snoring     Stroke [facility]) 2008    Thyroid disease 1988     Review of Systems   Constitutional: Positive for malaise/fatigue. Negative for chills and fever. HENT: Negative for ear pain. Eyes: Negative for pain and discharge. Respiratory: Negative for cough and hemoptysis. Cardiovascular: Negative for chest pain and claudication. Gastrointestinal: Negative for constipation and diarrhea. Genitourinary: Negative for flank pain and hematuria. Musculoskeletal: Positive for myalgias. Negative for back pain. Skin: Negative for itching and rash. Neurological: Negative for headaches. Endo/Heme/Allergies: Negative for environmental allergies. Does not bruise/bleed easily. Psychiatric/Behavioral: Positive for depression. Negative for hallucinations. The patient is nervous/anxious and has insomnia. Exam:    Visit Vitals    /72    Pulse 95    Ht 5' 2\" (1.575 m)    Wt 211 lb (95.7 kg)    SpO2 99%    BMI 38.59 kg/m2      General: Well developed, well nourished. Patient in no apparent distress   Head: Normocephalic, atraumatic, anicteric sclera   Neck Normal ROM, No thyromegally   Lungs:  Clear to auscultation bilaterally, No wheezes or rubs   Cardiac: Regular rate and rhythm with no murmurs. Abd: Bowel sounds were audible. No tenderness on palpation   Ext: No pedal edema   Skin: Supple no rash     NeurologicExam:  Mental Status: Alert and oriented to person place and time   Speech: Fluent no aphasia or dysarthria. Cranial Nerves:  II - XII Intact   Motor:  Full and symmetric strength of upper and lower proximal and distal muscles. Normal bulk and tone. Reflexes:   Deep tendon reflexes 3+/4 and symmetric.    Sensory:   Symmetric and intact with no perceived deficits modalities involving small or large fibers. Gait:  Gait is balanced and fluid with normal arm swing. Tremor:   No tremor noted. Cerebellar:  Coordination intact. Neurovascular: No carotid bruits. No JVD           Imaging    CT Results (most recent):    Results from Hospital Encounter encounter on 11/16/11   CT HEAD WITHOUT CONTRAST   Narrative **Final Report**      ICD Codes / Adm. Diagnosis: 780.4   / VERTIGO    Examination:  CT HEAD  CON  - [id number] - Nov 16 2011 11:11AM  Accession No:  [id number]  Reason:  vertigo      REPORT:  EXAM:  CT HEAD WITHOUT CONTRAST    INDICATION: Vertigo. COMPARISON: None. CONTRAST: None. TECHNIQUE: Unenhanced CT of the head was performed using 5 mm images. Brain   and bone windows were generated. FINDINGS:  The ventricles and sulci are normal in size, shape and configuration and   midline. There is no significant white matter disease. There is no   intracranial hemorrhage, extra-axial collection, mass, mass effect or   midline shift. The basilar cisterns are open. No acute infarct is   identified. The bone windows demonstrate no abnormalities. The visualized   portions of the paranasal sinuses and mastoid air cells are clear. IMPRESSION: Normal unenhanced CT examination of the head.            Signing/Reading Doctor: Matt Costa ([id number])    Approved: Matt Costa ([id number])  11/16/2011

## 2018-05-23 NOTE — MR AVS SNAPSHOT
[address], 
[id number], [address] [facility] 
[phone] Patient: Anita Albrecht MRN: [id number] SBL:[birth year] Visit Information Date & Time Provider Department Dept. Phone Encounter #  
 5/23/2018 10:40 AM Jer Bentley MD [facility] [phone] [id number] Follow-up Instructions Return in about 6 months (around 11/23/2018) for stroke. Upcoming Health Maintenance Date Due Pneumococcal 19-64 Medium Risk (1 of 1 - PPSV23) 7/15/1989 DTaP/Tdap/Td series (1 - Tdap) 7/15/1991 PAP AKA CERVICAL CYTOLOGY 7/15/1991 Influenza Age 5 to Adult 8/1/2018 Allergies as of 5/23/2018  Review Complete On: 5/23/2018 By: Jer Bentley MD  
 Not on File Current Immunizations  Never Reviewed No immunizations on file. Not reviewed this visit You Were Diagnosed With   
  
 Codes Comments C[facility]bellar stroke [facility]    -  Primary ICD-10-CM: I63.9 ICD-9-CM: 434.91 Vertebral artery dissection (HCC)     ICD-10-CM: I77.74 ICD-9-CM: 443.24 Chronic anticoagulation     ICD-10-CM: Z79.01 
ICD-9-CM: V58.61 Anxiety     ICD-10-CM: F41.9 ICD-9-CM: 300.00 Vitals BP Pulse Height(growth percentile) Weight(growth percentile) SpO2 BMI  
 110/72 95 5' 2\" (1.575 m) 211 lb (95.7 kg) 99% 38.59 kg/m2 Smoking Status Current Some Day Smoker Vitals History BMI and BSA Data Body Mass Index Body Surface Area 38.59 kg/m 2 2.05 m 2 Preferred Pharmacy Pharmacy Name Phone [id number], [address] [place] [address] Street [phone] Your Updated Medication List  
  
   
This list is accurate as of 5/23/18 12:12 PM.  Always use your most recent med list.  
  
  
  
  
 ALPRAZolam 0.5 mg tablet Commonly known as:  Marlen Kanwal Take 1 Tab by mouth daily as needed for Anxiety. Indications: Generalized Anxiety Disorder  
  
 atorvastatin 40 mg tablet Commonly known as:  LIPITOR Take 1 Tab by mouth daily. dicyclomine 20 mg tablet Commonly known as:  BENTYL TAKE 1 TABLET BY MOUTH 4 TIMES DAILY AS NEEDED FOR SPASM  
  
 EFFEXOR  mg capsule Generic drug:  venlafaxine-SR Take  by mouth daily. esomeprazole 40 mg capsule Commonly known as:  Harlon Sleeper Take one capsule by mouth every day  
  
 ferrous sulfate 325 mg (65 mg iron) tablet TAKE 1 TABLET BY MOUTH 3 TIMES A DAY  
  
 furosemide 20 mg tablet Commonly known as:  LASIX TAKE 1 TABLET BY ORAL ROUTE EVERY DAY  
  
 gabapentin enacarbil 600 mg Tber Commonly known as:  Yuvonne Letitia Take 600 mg by mouth daily. meclizine 12.5 mg tablet Commonly known as:  ANTIVERT  
TAKE 1- 2 TABLETS BY MOUTH 3 TIMES A DAY AS NEEDED FOR DIZZINESS  
  
 thyroid (Pork) 60 mg tablet Commonly known as:  ARMOUR THYROID Take 2 and 1/2 tablets by mouth in the morning  
  
 tiZANidine 4 mg tablet Commonly known as:  Hosie Munda Take 1 Tab by mouth three (3) times daily as needed. warfarin 5 mg tablet Commonly known as:  COUMADIN Take 5 mg by mouth daily. Prescriptions Printed Refills ALPRAZolam (XANAX) 0.5 mg tablet 3 Sig: Take 1 Tab by mouth daily as needed for Anxiety. Indications: Generalized Anxiety Disorder Class: Print Route: Oral  
  
Follow-up Instructions Return in about 6 months (around 11/23/2018) for stroke. Patient Instructions Office Policies Phone calls/patient messages: Please allow up to 24 hours for someone in the office to contact you about your call or message. Be mindful your provider may be out of the office or your message may require further review. We encourage you to use Contactually for your messages as this is a faster, more efficient way to communicate with our office Medication Refills: 
 
Prescription medications require up to 48 business hours to process. We encourage you to use 4moms for your refills. For controlled medications: Please allow up to 72 business hours to process. Certain medications may require you to  a written prescription at our office. NO narcotic/controlled medications will be prescribed after 4pm Monday through Friday or on weekends Form/Paperwork Completion: 
 
Please note there is a $25 fee for all paperwork completed by our providers. We ask that you allow 7-14 business days. Pre-payment is due prior to picking up/faxing the completed form. You may also download your forms to 4moms to have your doctor print off. A Healthy Lifestyle: Care Instructions Your Care Instructions A healthy lifestyle can help you feel good, stay at a healthy weight, and have plenty of energy for both work and play. A healthy lifestyle is something you can share with your whole family. A healthy lifestyle also can lower your risk for serious health problems, such as high blood pressure, heart disease, and diabetes. You can follow a few steps listed below to improve your health and the health of your family. Follow-up care is a key part of your treatment and safety. Be sure to make and go to all appointments, and call your doctor if you are having problems. It's also a good idea to know your test results and keep a list of the medicines you take. How can you care for yourself at home? · Do not eat too much sugar, fat, or fast foods. You can still have dessert and treats now and then. The goal is moderation. · Start small to improve your eating habits. Pay attention to portion sizes, drink less juice and soda pop, and eat more fruits and vegetables. ¨ Eat a healthy amount of food. A 3-ounce serving of meat, for example, is about the size of a deck of cards. Fill the rest of your plate with vegetables and whole grains. ¨ Limit the amount of soda and sports drinks you have every day. Drink more water when you are thirsty. ¨ Eat at least 5 servings of fruits and vegetables every day. It may seem like a lot, but it is not hard to reach this goal. A serving or helping is 1 piece of fruit, 1 cup of vegetables, or 2 cups of leafy, raw vegetables. Have an apple or some carrot sticks as an afternoon snack instead of a candy bar. Try to have fruits and/or vegetables at every meal. 
· Make exercise part of your daily routine. You may want to start with simple activities, such as walking, bicycling, or slow swimming. Try to be active 30 to 60 minutes every day. You do not need to do all 30 to 60 minutes all at once. For example, you can exercise 3 times a day for 10 or 20 minutes. Moderate exercise is safe for most people, but it is always a good idea to talk to your doctor before starting an exercise program. 
· Keep moving. Nany Banner the lawn, work in the garden, or [employer]. Take the stairs instead of the elevator at work. · If you smoke, quit. People who smoke have an increased risk for heart attack, stroke, cancer, and other lung illnesses. Quitting is hard, but there are ways to boost your chance of quitting tobacco for good. ¨ Use nicotine gum, patches, or lozenges. ¨ Ask your doctor about stop-smoking programs and medicines. ¨ Keep trying. In addition to reducing your risk of diseases in the future, you will notice some benefits soon after you stop using tobacco. If you have shortness of breath or asthma symptoms, they will likely get better within a few weeks after you quit. · Limit how much alcohol you drink. Moderate amounts of alcohol (up to 2 drinks a day for men, 1 drink a day for women) are okay. But drinking too much can lead to liver problems, high blood pressure, and other health problems. Family health If you have a family, there are many things you can do together to improve your health. · Eat meals together as a family as often as possible. · Eat healthy foods. This includes fruits, vegetables, lean meats and dairy, and whole grains. · Include your family in your fitness plan. Most people think of activities such as jogging or tennis as the way to fitness, but there are many ways you and your family can be more active. Anything that makes you breathe hard and gets your heart pumping is exercise. Here are some tips: 
¨ Walk to do errands or to take your child to school or the bus. ¨ Go for a family bike ride after dinner instead of watching TV. Where can you learn more? Go to http://elsaInfoVistasarah.info/. Enter Q800 in the search box to learn more about \"A Healthy Lifestyle: Care Instructions. \" Current as of: May 12, 2017 Content Version: 11.4 © 9115-3456 "[employer]. Care instructions adapted under license by [employer] (which disclaims liability or warranty for this information). If you have questions about a medical condition or this instruction, always ask your healthcare professional. [address] any warranty or liability for your use of this information. Introducing [facility]! Bronwyn Frost introduces [employer] patient portal. Now you can access parts of your medical record, email your doctor's office, and request medication refills online. 1. In your internet browser, go to [email or web address] 2. Click on the First Time User? Click Here link in the Sign In box. You will see the New Member Sign Up page. 3. Enter your [employer] Access Code exactly as it appears below. You will not need to use this code after youve completed the sign-up process. If you do not sign up before the expiration date, you must request a new code. · [employer] Access Code: [email or web address] Expires: 8/21/2018 11:04 AM 
 
4.  Enter the last four digits of your Social Security Number (xxxx) and Date of Birth (mm/dd/yyyy) as indicated and click Submit. You will be taken to the next sign-up page. 5. Create a JoinUp Tax[employer] ID. This will be your Join[employer] login ID and cannot be changed, so think of one that is secure and easy to remember. 6. Create a [employer] password. You can change your password at any time. 7. Enter your Password Reset Question and Answer. This can be used at a later time if you forget your password. 8. Enter your e-mail address. You will receive e-mail notification when new information is available in [address] 9. Click Sign Up. You can now view and download portions of your medical record. 10. Click the Download Summary menu link to download a portable copy of your medical information. If you have questions, please visit the Frequently Asked Questions section of the [employer] website. Remember, [employer]i is NOT to be used for urgent needs. For medical emergencies, dial 911. Now available from your iPhone and Android! Please provide this summary of care documentation to your next provider. Your primary care clinician is listed as PROVIDER UNKNOWN. If you have any questions after today's visit, please call [phone].

## 2018-06-01 RX ORDER — VENLAFAXINE HYDROCHLORIDE 150 MG/1
150 CAPSULE, EXTENDED RELEASE ORAL DAILY
Qty: 30 CAP | Refills: 11 | Status: SHIPPED | OUTPATIENT
Start: 2018-06-01 | End: 2019-06-06 | Stop reason: SDUPTHER

## 2018-06-01 NOTE — TELEPHONE ENCOUNTER
*Received a fax request for the medication*    Future Appointments  Date Time Provider Kp Choi   11/21/2018 11:00 AM Tra Morrison MD [address]                         Last Appointment My Department:  5/23/2018    Would you like to refill below? Requested Prescriptions     Pending Prescriptions Disp Refills    venlafaxine-SR (EFFEXOR XR) 150 mg capsule       Sig: Take  by mouth daily.

## 2018-06-19 DIAGNOSIS — R10.2 PELVIC PAIN: ICD-10-CM

## 2018-06-19 DIAGNOSIS — G58.8 PUDENDAL NEURALGIA: Primary | ICD-10-CM

## 2018-06-19 DIAGNOSIS — M62.838 MUSCLE SPASM: ICD-10-CM

## 2018-06-28 ENCOUNTER — HOSPITAL ENCOUNTER (OUTPATIENT)
Dept: MRI IMAGING | Age: 48
Discharge: HOME | End: 2018-06-28
Attending: PSYCHIATRY & NEUROLOGY
Payer: COMMERCIAL

## 2018-06-28 DIAGNOSIS — G58.8 PUDENDAL NEURALGIA: ICD-10-CM

## 2018-06-28 DIAGNOSIS — R10.2 PELVIC PAIN: ICD-10-CM

## 2018-06-28 DIAGNOSIS — M62.838 MUSCLE SPASM: ICD-10-CM

## 2018-06-28 PROCEDURE — 72195 MRI PELVIS W/O DYE: CPT

## 2018-07-01 NOTE — PROGRESS NOTES
Lesion in both ovaries that may be endometrial   Ultrasound in 6-8 weeks recommended. Changes seen in the bone marrow is consistent with increased activity to replace lost red blood cells.

## 2018-07-11 DIAGNOSIS — E78.01 FAMILIAL HYPERCHOLESTEROLEMIA: ICD-10-CM

## 2018-07-11 NOTE — TELEPHONE ENCOUNTER
Future Appointments  Date Time Provider Kp Sarmiento   11/21/2018 11:00 AM Bandar Meyer MD [address]                         Last Appointment My Department:  5/23/2018    Would you like to refill below? Requested Prescriptions     Pending Prescriptions Disp Refills    atorvastatin (LIPITOR) 40 mg tablet 30 Tab 5     Sig: Take 1 Tab by mouth daily.

## 2018-07-12 RX ORDER — ATORVASTATIN CALCIUM 40 MG/1
40 TABLET, FILM COATED ORAL DAILY
Qty: 30 TAB | Refills: 5 | Status: SHIPPED | OUTPATIENT
Start: 2018-07-12 | End: 2019-01-15 | Stop reason: SDUPTHER

## 2018-10-04 RX ORDER — ESOMEPRAZOLE MAGNESIUM 40 MG/1
CAPSULE, DELAYED RELEASE ORAL
Qty: 30 CAP | Refills: 5 | Status: SHIPPED | OUTPATIENT
Start: 2018-10-04 | End: 2022-10-31

## 2018-10-10 ENCOUNTER — OFFICE VISIT (OUTPATIENT)
Dept: NEUROLOGY | Age: 48
End: 2018-10-10

## 2018-10-10 VITALS
SYSTOLIC BLOOD PRESSURE: 116 MMHG | WEIGHT: 211.8 LBS | HEART RATE: 82 BPM | DIASTOLIC BLOOD PRESSURE: 72 MMHG | BODY MASS INDEX: 38.98 KG/M2 | OXYGEN SATURATION: 99 % | HEIGHT: 62 IN

## 2018-10-10 DIAGNOSIS — I77.74 VERTEBRAL ARTERY DISSECTION (HCC): Primary | ICD-10-CM

## 2018-10-10 DIAGNOSIS — I63.9 CEREBELLAR STROKE (HCC): ICD-10-CM

## 2018-10-10 DIAGNOSIS — G25.81 RESTLESS LEG SYNDROME, CONTROLLED: ICD-10-CM

## 2018-10-10 DIAGNOSIS — G47.01 INSOMNIA DUE TO MEDICAL CONDITION: ICD-10-CM

## 2018-10-10 DIAGNOSIS — R76.0 LUPUS ANTICOAGULANT POSITIVE: ICD-10-CM

## 2018-10-10 DIAGNOSIS — D68.61 ANTI-PHOSPHOLIPID ANTIBODY SYNDROME (HCC): ICD-10-CM

## 2018-10-10 DIAGNOSIS — Z79.01 CHRONIC ANTICOAGULATION: ICD-10-CM

## 2018-10-10 DIAGNOSIS — N80.9 ENDOMETRIOSIS: ICD-10-CM

## 2018-10-10 RX ORDER — UREA 10 %
800 LOTION (ML) TOPICAL DAILY
COMMUNITY
End: 2019-02-19 | Stop reason: ALTCHOICE

## 2018-10-10 RX ORDER — MULTIVIT WITH MINERALS/HERBS
1 TABLET ORAL DAILY
COMMUNITY
End: 2019-02-19 | Stop reason: ALTCHOICE

## 2018-10-10 RX ORDER — VITAMIN E 1000 UNIT
CAPSULE ORAL
COMMUNITY
End: 2022-10-31

## 2018-10-10 RX ORDER — ZOLPIDEM TARTRATE 10 MG/1
10 TABLET ORAL
Qty: 20 TAB | Refills: 3 | Status: SHIPPED | OUTPATIENT
Start: 2018-10-10 | End: 2019-02-19 | Stop reason: ALTCHOICE

## 2018-10-10 RX ORDER — LANOLIN ALCOHOL/MO/W.PET/CERES
1000 CREAM (GRAM) TOPICAL DAILY
COMMUNITY
End: 2019-02-19 | Stop reason: ALTCHOICE

## 2018-10-10 RX ORDER — GLUCOSAMINE SULFATE 1500 MG
POWDER IN PACKET (EA) ORAL DAILY
COMMUNITY
End: 2019-02-19 | Stop reason: ALTCHOICE

## 2018-10-10 NOTE — MR AVS SNAPSHOT
[address] [facility], 
[id number], [address] [place] [address] 
[phone] Patient: Jackelyn Box MRN: [id number] OGM:[id number] Visit Information Date & Time Provider Department Dept. Phone Encounter #  
 10/10/2018  3:40 PM Laury Saunders MD Neurology Clinic at [facility] [phone] [id number] Follow-up Instructions Return in about 4 months (around 2/10/2019), or vertebral dissection. Your Appointments 11/21/2018 11:00 AM  
Follow Up with Laury Saunders MD  
Neurology Clinic at [facility] Appt Note: Follow up [address], 
[id number], [address] [address]  
[address] [facility], [address] [place] [address] [address] [address] Upcoming Health Maintenance Date Due Pneumococcal 19-64 Medium Risk (1 of 1 - PPSV23) 7/15/1989 DTaP/Tdap/Td series (1 - Tdap) 7/15/1991 PAP AKA CERVICAL CYTOLOGY 7/15/1991 Influenza Age 5 to Adult 8/1/2018 Allergies as of 10/10/2018  Review Complete On: 10/10/2018 By: Laury Saunders MD  
 Not on File Current Immunizations  Never Reviewed No immunizations on file. Not reviewed this visit You Were Diagnosed With   
  
 Codes Comments Vertebral artery dissection (HCC)    -  Primary ICD-10-CM: I77.74 ICD-9-CM: 443.24 C[facility]bellar [facility] ([facility]     ICD-10-CM: I63.9 ICD-9-CM: 434.91 Endometriosis     ICD-10-CM: N80.9 ICD-9-CM: 617.9 Chronic anticoagulation     ICD-10-CM: Z79.01 
ICD-9-CM: V58.61 Insomnia due to medical condition     ICD-10-CM: G47.01 
ICD-9-CM: 327.01 Restless leg syndrome, controlled     ICD-10-CM: G25.81 ICD-9-CM: 333.94 Vitals BP Pulse Height(growth percentile) Weight(growth percentile) SpO2 BMI  
 116/72 82 5' 2\" (1.575 m) 211 lb 12.8 oz (96.1 kg) 99% 38.74 kg/m2 Smoking Status General: Current Some Day Smoker BMI and BSA Data Body Mass Index Body Surface Area 38.74 kg/m 2 2.05 m 2 Preferred Pharmacy Pharmacy Name Phone [id number] Gaspar, [address] [place] [address] [phone] Your Updated Medication List  
  
   
This list is accurate as of 10/10/18  5:00 PM.  Always use your most recent med list.  
  
  
  
  
 ALPRAZolam 0.5 mg tablet Commonly known as:  Soledad Michaela Take 1 Tab by mouth daily as needed for Anxiety. Indications: Generalized Anxiety Disorder  
  
 atorvastatin 40 mg tablet Commonly known as:  LIPITOR Take 1 Tab by mouth daily. b complex vitamins tablet Take 1 Tab by mouth daily. dicyclomine 20 mg tablet Commonly known as:  BENTYL TAKE 1 TABLET BY MOUTH 4 TIMES DAILY AS NEEDED FOR SPASM  
  
 ferrous sulfate 325 mg (65 mg iron) tablet TAKE 1 TABLET BY MOUTH 3 TIMES A DAY  
  
 folic acid 760 mcg tablet Take 800 mcg by mouth daily. furosemide 20 mg tablet Commonly known as:  LASIX TAKE 1 TABLET BY ORAL ROUTE EVERY DAY  
  
 * gabapentin enacarbil 600 mg Tber Commonly known as:  Sammuel Dine Take 600 mg by mouth daily. * gabapentin enacarbil 600 mg Tber Commonly known as:  Sammuel Dine Take 600 mg by mouth daily. meclizine 12.5 mg tablet Commonly known as:  ANTIVERT  
TAKE 1- 2 TABLETS BY MOUTH 3 TIMES A DAY AS NEEDED FOR DIZZINESS NexIUM 40 mg capsule Generic drug:  esomeprazole TAKE ONE CAPSULE BY MOUTH EVERY DAY  
  
 thyroid (Pork) 60 mg tablet Commonly known as:  ARMOUR THYROID Take 2 and 1/2 tablets by mouth in the morning  
  
 tiZANidine 4 mg tablet Commonly known as:  Lata Kicks Take 1 Tab by mouth three (3) times daily as needed. venlafaxine- mg capsule Commonly known as:  EFFEXOR XR Take 1 Cap by mouth daily. VITAMIN B-12 1,000 mcg tablet Generic drug:  cyanocobalamin Take 1,000 mcg by mouth daily. VITAMIN C 1,000 mg tablet Generic drug:  ascorbic acid (vitamin C) Take  by mouth. VITAMIN D3 1,000 unit Cap Generic drug:  cholecalciferol Take  by mouth daily. warfarin 5 mg tablet Commonly known as:  COUMADIN Take 5 mg by mouth daily. zolpidem 10 mg tablet Commonly known as:  AMBIEN Take 1 Tab by mouth nightly as needed for Sleep. Max Daily Amount: 10 mg.  
  
 * Notice: This list has 2 medication(s) that are the same as other medications prescribed for you. Read the directions carefully, and ask your doctor or other care provider to review them with you. Prescriptions Printed Refills  
 zolpidem (AMBIEN) 10 mg tablet 3 Sig: Take 1 Tab by mouth nightly as needed for Sleep. Max Daily Amount: 10 mg.  
 Class: Print Route: Oral  
  
Follow-up Instructions Return in about 4 months (around 2/10/2019), or vertebral dissection. Patient Instructions A Healthy Lifestyle: Care Instructions Your Care Instructions A healthy lifestyle can help you feel good, stay at a healthy weight, and have plenty of energy for both work and play. A healthy lifestyle is something you can share with your whole family. A healthy lifestyle also can lower your risk for serious health problems, such as high blood pressure, heart disease, and diabetes. You can follow a few steps listed below to improve your health and the health of your family. Follow-up care is a key part of your treatment and safety. Be sure to make and go to all appointments, and call your doctor if you are having problems. It's also a good idea to know your test results and keep a list of the medicines you take. How can you care for yourself at home? · Do not eat too much sugar, fat, or fast foods. You can still have dessert and treats now and then. The goal is moderation. · Start small to improve your eating habits.  Pay attention to portion sizes, drink less juice and soda pop, and eat more fruits and vegetables. ¨ Eat a healthy amount of food. A 3-ounce serving of meat, for example, is about the size of a deck of cards. Fill the rest of your plate with vegetables and whole grains. ¨ Limit the amount of soda and sports drinks you have every day. Drink more water when you are thirsty. ¨ Eat at least 5 servings of fruits and vegetables every day. It may seem like a lot, but it is not hard to reach this goal. A serving or helping is 1 piece of fruit, 1 cup of vegetables, or 2 cups of leafy, raw vegetables. Have an apple or some carrot sticks as an afternoon snack instead of a candy bar. Try to have fruits and/or vegetables at every meal. 
· Make exercise part of your daily routine. You may want to start with simple activities, such as walking, bicycling, or slow swimming. Try to be active 30 to 60 minutes every day. You do not need to do all 30 to 60 minutes all at once. For example, you can exercise 3 times a day for 10 or 20 minutes. Moderate exercise is safe for most people, but it is always a good idea to talk to your doctor before starting an exercise program. 
· Keep moving. [place] Brandark the lawn, work in the garden, or [employer]. Take the stairs instead of the elevator at work. · If you smoke, quit. People who smoke have an increased risk for heart attack, stroke, cancer, and other lung illnesses. Quitting is hard, but there are ways to boost your chance of quitting tobacco for good. ¨ Use nicotine gum, patches, or lozenges. ¨ Ask your doctor about stop-smoking programs and medicines. ¨ Keep trying. In addition to reducing your risk of diseases in the future, you will notice some benefits soon after you stop using tobacco. If you have shortness of breath or asthma symptoms, they will likely get better within a few weeks after you quit. · Limit how much alcohol you drink.  Moderate amounts of alcohol (up to 2 drinks a day for men, 1 drink a day for women) are okay. But drinking too much can lead to liver problems, high blood pressure, and other health problems. Family health If you have a family, there are many things you can do together to improve your health. · Eat meals together as a family as often as possible. · Eat healthy foods. This includes fruits, vegetables, lean meats and dairy, and whole grains. · Include your family in your fitness plan. Most people think of activities such as jogging or tennis as the way to fitness, but there are many ways you and your family can be more active. Anything that makes you breathe hard and gets your heart pumping is exercise. Here are some tips: 
¨ Walk to do errands or to take your child to school or the bus. ¨ Go for a family bike ride after dinner instead of watching TV. Where can you learn more? Go to http://elsa-sarah.info/. Enter G555 in the search box to learn more about \"A Healthy Lifestyle: Care Instructions. \" Current as of: December 7, 2017 Content Version: 11.8 © 1378-7923 ROVOP. Care instructions adapted under license by [employer] (which disclaims liability or warranty for this information). If you have questions about a medical condition or this instruction, always ask your healthcare professional. [address] any warranty or liability for your use of this information. Please be advised there is a $25 fee for all paperwork to be completed from our  providers. This is to be paid by the patient prior to picking up the completed forms. Introducing [facility]! Dear Mayela Ireland: 
Thank you for requesting a [employer] account. Our records indicate that you already have an active [employer] account. You can access your account anytime at [email or web address] Did you know that you can access your hospital and ER discharge instructions at any time in [employer]? You can also review all of your test results from your hospital stay or ER visit. Additional Information If you have questions, please visit the Frequently Asked Questions section of the [employer] website at [email or web address] Remember, [employer] is NOT to be used for urgent needs. For medical emergencies, dial 911. Now available from your iPhone and Android! Please provide this summary of care documentation to your next provider. Your primary care clinician is listed as Giacomo Henry. If you have any questions after today's visit, please call [phone].

## 2018-10-10 NOTE — PATIENT INSTRUCTIONS
A Healthy Lifestyle: Care Instructions  Your Care Instructions    A healthy lifestyle can help you feel good, stay at a healthy weight, and have plenty of energy for both work and play. A healthy lifestyle is something you can share with your whole family. A healthy lifestyle also can lower your risk for serious health problems, such as high blood pressure, heart disease, and diabetes. You can follow a few steps listed below to improve your health and the health of your family. Follow-up care is a key part of your treatment and safety. Be sure to make and go to all appointments, and call your doctor if you are having problems. It's also a good idea to know your test results and keep a list of the medicines you take. How can you care for yourself at home? · Do not eat too much sugar, fat, or fast foods. You can still have dessert and treats now and then. The goal is moderation. · Start small to improve your eating habits. Pay attention to portion sizes, drink less juice and soda pop, and eat more fruits and vegetables. ¨ Eat a healthy amount of food. A 3-ounce serving of meat, for example, is about the size of a deck of cards. Fill the rest of your plate with vegetables and whole grains. ¨ Limit the amount of soda and sports drinks you have every day. Drink more water when you are thirsty. ¨ Eat at least 5 servings of fruits and vegetables every day. It may seem like a lot, but it is not hard to reach this goal. A serving or helping is 1 piece of fruit, 1 cup of vegetables, or 2 cups of leafy, raw vegetables. Have an apple or some carrot sticks as an afternoon snack instead of a candy bar. Try to have fruits and/or vegetables at every meal.  · Make exercise part of your daily routine. You may want to start with simple activities, such as walking, bicycling, or slow swimming. Try to be active 30 to 60 minutes every day. You do not need to do all 30 to 60 minutes all at once.  For example, you can exercise 3 times a day for 10 or 20 minutes. Moderate exercise is safe for most people, but it is always a good idea to talk to your doctor before starting an exercise program.  · Keep moving. Anatoliy Goulde the lawn, work in the garden, or [employer]. Take the stairs instead of the elevator at work. · If you smoke, quit. People who smoke have an increased risk for heart attack, stroke, cancer, and other lung illnesses. Quitting is hard, but there are ways to boost your chance of quitting tobacco for good. ¨ Use nicotine gum, patches, or lozenges. ¨ Ask your doctor about stop-smoking programs and medicines. ¨ Keep trying. In addition to reducing your risk of diseases in the future, you will notice some benefits soon after you stop using tobacco. If you have shortness of breath or asthma symptoms, they will likely get better within a few weeks after you quit. · Limit how much alcohol you drink. Moderate amounts of alcohol (up to 2 drinks a day for men, 1 drink a day for women) are okay. But drinking too much can lead to liver problems, high blood pressure, and other health problems. Family health  If you have a family, there are many things you can do together to improve your health. · Eat meals together as a family as often as possible. · Eat healthy foods. This includes fruits, vegetables, lean meats and dairy, and whole grains. · Include your family in your fitness plan. Most people think of activities such as jogging or tennis as the way to fitness, but there are many ways you and your family can be more active. Anything that makes you breathe hard and gets your heart pumping is exercise. Here are some tips:  ¨ Walk to do errands or to take your child to school or the bus. ¨ Go for a family bike ride after dinner instead of watching TV. Where can you learn more? Go to http://elsa-sarah.info/. Enter Q752 in the search box to learn more about \"A Healthy Lifestyle: Care Instructions. \"  Current as of: December 7, 2017  Content Version: 11.8  © 4227-5984 Healthwise, Incorporated. Care instructions adapted under license by [employer] (which disclaims liability or warranty for this information). If you have questions about a medical condition or this instruction, always ask your healthcare professional. [address] any warranty or liability for your use of this information. Please be advised there is a $25 fee for all paperwork to be completed from our  providers. This is to be paid by the patient prior to picking up the completed forms.

## 2018-10-11 PROBLEM — R76.0 LUPUS ANTICOAGULANT POSITIVE: Status: ACTIVE | Noted: 2018-10-11

## 2018-10-11 PROBLEM — D68.61 ANTI-PHOSPHOLIPID ANTIBODY SYNDROME (HCC): Status: ACTIVE | Noted: 2018-10-11

## 2018-10-11 NOTE — PROGRESS NOTES
Name: Calvin Hardy    Chief Complaint: Stroke    Patient returns for a follow up visit. Underwent an emergent hysterectomy secondary to uncontrolled bleeding after her uterine ablation. She was found to have endometriosis confined to the uterus and the right ovary the left ovary was free of endometrial invasion as well as the pelvic cavity. Since the surgery she developed what sounds like a pleurisy which she is being treated for. There is also questionable mitral valve regurgitation which she is being followed for by cardiology. She understandably feels anxious and depressed and has been having trouble sleeping. I reassured her that the symptoms should improve over time. She was placed on BuSpar however it is unavailable on the market. Assesment and Plan  1. Cerebellar stroke ([facility]  Remote no new symptoms to suggest extension. 2. Vertebral artery dissection ([facility]  Remote with stable thrombus  Continue coumadin    3. Chronic anticoagulation  For lupus anticoagualnt    4. Anxiety  Continue alprazolam    5. Lupus anticoagulant and antiphospolipid syndrome  Continue coumadin    6. Hypothyroidism  On [place] Thyroid    7. Restless leg syndrome  Continue Horizant    8. Insomnia  Short course of Ambien    45  minutes spent more than 50% spent in face to face  examination and discussion of treatment options and approach    Allergies  Review of patient's allergies indicates not on file. Medications  Current Outpatient Prescriptions   Medication Sig    cyanocobalamin (VITAMIN B-12) 1,000 mcg tablet Take 1,000 mcg by mouth daily.  cholecalciferol (VITAMIN D3) 1,000 unit cap Take  by mouth daily.  folic acid 390 mcg tablet Take 800 mcg by mouth daily.  ascorbic acid, vitamin C, (VITAMIN C) 1,000 mg tablet Take  by mouth.  b complex vitamins tablet Take 1 Tab by mouth daily.  zolpidem (AMBIEN) 10 mg tablet Take 1 Tab by mouth nightly as needed for Sleep.  Max Daily Amount: 10 mg.   Da Prajapati gabapentin enacarbil (HORIZANT) 600 mg TbER Take 600 mg by mouth daily.  NEXIUM 40 mg capsule TAKE ONE CAPSULE BY MOUTH EVERY DAY    atorvastatin (LIPITOR) 40 mg tablet Take 1 Tab by mouth daily.  venlafaxine-SR (EFFEXOR XR) 150 mg capsule Take 1 Cap by mouth daily.  ferrous sulfate 325 mg (65 mg iron) tablet TAKE 1 TABLET BY MOUTH 3 TIMES A DAY    ALPRAZolam (XANAX) 0.5 mg tablet Take 1 Tab by mouth daily as needed for Anxiety. Indications: Generalized Anxiety Disorder    meclizine (ANTIVERT) 12.5 mg tablet TAKE 1- 2 TABLETS BY MOUTH 3 TIMES A DAY AS NEEDED FOR DIZZINESS    furosemide (LASIX) 20 mg tablet TAKE 1 TABLET BY ORAL ROUTE EVERY DAY    dicyclomine (BENTYL) 20 mg tablet TAKE 1 TABLET BY MOUTH 4 TIMES DAILY AS NEEDED FOR SPASM    gabapentin enacarbil (HORIZANT) 600 mg TbER Take 600 mg by mouth daily.  tiZANidine (ZANAFLEX) 4 mg tablet Take 1 Tab by mouth three (3) times daily as needed.  warfarin (COUMADIN) 5 mg tablet Take 5 mg by mouth daily.  thyroid, Pork, (ARMOUR THYROID) 60 mg tablet Take 2 and 1/2 tablets by mouth in the morning     No current facility-administered medications for this visit. Medical History  Past Medical History:   Diagnosis Date    Anemia     Anxiety disorder     Endometriosis 06/12/2017    Hearing reduced     Joint pain     Memory disorder     Migraine 2008    Nausea & vomiting     Poor appetite     Snoring     Stroke [facility]) 2008    Thyroid disease 1988     Review of Systems   Constitutional: Positive for malaise/fatigue. Negative for chills and fever. HENT: Negative for ear pain. Eyes: Negative for pain and discharge. Respiratory: Negative for cough and hemoptysis. Cardiovascular: Negative for chest pain and claudication. Gastrointestinal: Negative for constipation and diarrhea. Genitourinary: Negative for flank pain and hematuria. Musculoskeletal: Positive for myalgias. Negative for back pain.    Skin: Negative for itching and rash. Neurological: Negative for headaches. Endo/Heme/Allergies: Negative for environmental allergies. Does not bruise/bleed easily. Psychiatric/Behavioral: Positive for depression. Negative for hallucinations. The patient is nervous/anxious and has insomnia. Exam:    Visit Vitals    /72    Pulse 82    Ht 5' 2\" (1.575 m)    Wt 211 lb 12.8 oz (96.1 kg)    SpO2 99%    BMI 38.74 kg/m2      General: Well developed, well nourished. Patient in no apparent distress   Head: Normocephalic, atraumatic, anicteric sclera   Neck Normal ROM, No thyromegally   Lungs:  Clear to auscultation bilaterally, No wheezes or rubs   Cardiac: Regular rate and rhythm with no murmurs. Abd: Bowel sounds were audible. No tenderness on palpation   Ext: No pedal edema   Skin: Supple no rash     NeurologicExam:  Mental Status: Alert and oriented to person place and time   Speech: Fluent no aphasia or dysarthria. Cranial Nerves:  II - XII Intact   Motor:  Full and symmetric strength of upper and lower proximal and distal muscles. Normal bulk and tone. Reflexes:   Deep tendon reflexes 3+/4 and symmetric. Sensory:   Symmetric and intact with no perceived deficits modalities involving small or large fibers. Gait:  Gait is balanced and fluid with normal arm swing. Tremor:   No tremor noted. Cerebellar:  Coordination intact. Neurovascular: No carotid bruits. No JVD           Imaging    CT Results (most recent):    Results from Hospital Encounter encounter on 11/16/11   CT HEAD WITHOUT CONTRAST   Narrative **Final Report**      ICD Codes / Adm. Diagnosis: 780.4   / VERTIGO    Examination:  CT HEAD  CON  - [id number] - Nov 16 2011 11:11AM  Accession No:  [id number]  Reason:  vertigo      REPORT:  EXAM:  CT HEAD WITHOUT CONTRAST    INDICATION: Vertigo. COMPARISON: None. CONTRAST: None. TECHNIQUE: Unenhanced CT of the head was performed using 5 mm images. Brain   and bone windows were generated. FINDINGS:  The ventricles and sulci are normal in size, shape and configuration and   midline. There is no significant white matter disease. There is no   intracranial hemorrhage, extra-axial collection, mass, mass effect or   midline shift. The basilar cisterns are open. No acute infarct is   identified. The bone windows demonstrate no abnormalities. The visualized   portions of the paranasal sinuses and mastoid air cells are clear. IMPRESSION: Normal unenhanced CT examination of the head.            Signing/Reading Doctor: Diane Chase ([id number])    Approved: Diane Chase ([id number])  11/16/2011

## 2018-11-09 ENCOUNTER — TELEPHONE (OUTPATIENT)
Dept: NEUROLOGY | Age: 48
End: 2018-11-09

## 2018-11-09 NOTE — TELEPHONE ENCOUNTER
----- Message from Nelly Lincoln sent at 11/9/2018 10:20 AM EST -----  Regarding: Dr. Marcio Marsh stated she was given samples of (\"Horizant\" 30 day  supply) at her last appt, and will be out of medication on Sunday, and requested a refill called to [address] [place] [address] [phone]. Best contact number [phone].

## 2018-11-12 ENCOUNTER — TELEPHONE (OUTPATIENT)
Dept: NEUROLOGY | Age: 48
End: 2018-11-12

## 2018-11-12 DIAGNOSIS — G25.81 RESTLESS LEG SYNDROME, CONTROLLED: ICD-10-CM

## 2018-11-12 RX ORDER — GABAPENTIN ENACARBIL 600 MG/1
600 TABLET, EXTENDED RELEASE ORAL DAILY
Qty: 30 TAB | Refills: 3 | Status: SHIPPED | OUTPATIENT
Start: 2018-11-12 | End: 2019-02-19 | Stop reason: ALTCHOICE

## 2018-11-12 NOTE — TELEPHONE ENCOUNTER
Patient needs PA for horizant.   Please initiate  Using because of medical hx of mitral valve regurgitation   Only one ok'd by cardiologist

## 2018-11-13 ENCOUNTER — TELEPHONE (OUTPATIENT)
Dept: NEUROLOGY | Age: 48
End: 2018-11-13

## 2018-11-20 ENCOUNTER — TELEPHONE (OUTPATIENT)
Dept: NEUROLOGY | Age: 48
End: 2018-11-20

## 2018-11-20 NOTE — TELEPHONE ENCOUNTER
----- Message from Vitaliy Ochoa sent at 11/20/2018 12:50 PM EST -----  Regarding: Dr. Kamran Dykes with [facility] is calling regarding  auth for pt's \"horizant\"   600 mg medication. The best way is to go on  Covermymeds. com or fax number is  [phone]. No c/b needed

## 2018-11-20 NOTE — TELEPHONE ENCOUNTER
Electronic PA was sent 7 days ago - I refaxed it manually to [place] today and marked patient is out - need reply today.

## 2018-11-20 NOTE — TELEPHONE ENCOUNTER
Advised to the patient Emilia Warner is running 2 weeks behind with the prior auth however if she hears anything from them sooner to contact the office and let us know

## 2018-12-03 ENCOUNTER — TELEPHONE (OUTPATIENT)
Dept: NEUROLOGY | Age: 48
End: 2018-12-03

## 2019-01-15 DIAGNOSIS — E78.01 FAMILIAL HYPERCHOLESTEROLEMIA: ICD-10-CM

## 2019-01-15 NOTE — TELEPHONE ENCOUNTER
Future Appointments   Date Time Provider Kp Sarmiento   2/11/2019  3:00 PM MD SHAYNA Redd/ [address]                         Last Appointment My Department:  10/10/18    Please advise of refill below. Requested Prescriptions     Pending Prescriptions Disp Refills    atorvastatin (LIPITOR) 40 mg tablet 30 Tab 5     Sig: Take 1 Tab by mouth daily.

## 2019-01-16 RX ORDER — ATORVASTATIN CALCIUM 40 MG/1
40 TABLET, FILM COATED ORAL DAILY
Qty: 30 TAB | Refills: 5 | Status: SHIPPED | OUTPATIENT
Start: 2019-01-16 | End: 2019-07-15 | Stop reason: SDUPTHER

## 2019-02-11 ENCOUNTER — OFFICE VISIT (OUTPATIENT)
Dept: NEUROLOGY | Age: 49
End: 2019-02-11

## 2019-02-11 VITALS
BODY MASS INDEX: 39.01 KG/M2 | DIASTOLIC BLOOD PRESSURE: 70 MMHG | HEIGHT: 62 IN | HEART RATE: 72 BPM | SYSTOLIC BLOOD PRESSURE: 122 MMHG | OXYGEN SATURATION: 96 % | WEIGHT: 212 LBS

## 2019-02-11 DIAGNOSIS — M32.19 OTHER SYSTEMIC LUPUS ERYTHEMATOSUS WITH OTHER ORGAN INVOLVEMENT (HCC): ICD-10-CM

## 2019-02-11 DIAGNOSIS — E03.9 ACQUIRED HYPOTHYROIDISM: ICD-10-CM

## 2019-02-11 DIAGNOSIS — G25.81 RESTLESS LEG SYNDROME, CONTROLLED: ICD-10-CM

## 2019-02-11 DIAGNOSIS — I77.74 VERTEBRAL ARTERY DISSECTION (HCC): Primary | ICD-10-CM

## 2019-02-11 DIAGNOSIS — F41.9 ANXIETY: ICD-10-CM

## 2019-02-11 DIAGNOSIS — D68.62 LUPUS ANTICOAGULANT SYNDROME (HCC): ICD-10-CM

## 2019-02-11 PROBLEM — E66.01 SEVERE OBESITY (HCC): Status: ACTIVE | Noted: 2019-02-11

## 2019-02-11 RX ORDER — BUSPIRONE HYDROCHLORIDE 7.5 MG/1
7.5 TABLET ORAL DAILY
COMMUNITY
End: 2019-05-09 | Stop reason: SDUPTHER

## 2019-02-11 RX ORDER — ALPRAZOLAM 0.5 MG/1
0.5 TABLET ORAL
Qty: 60 TAB | Refills: 3 | Status: SHIPPED | OUTPATIENT
Start: 2019-02-11 | End: 2019-08-15 | Stop reason: SDUPTHER

## 2019-02-11 NOTE — PATIENT INSTRUCTIONS

## 2019-02-11 NOTE — PROGRESS NOTES
Name: Mian Vaughan Chief Complaint: Stroke Returns for follow-up visit. Continues to struggle with anxiety. Speculates that this may be related to perimenopausal symptoms. Restless leg is adequately addressed with current medications as well as improving ferritin levels. No longer takes Horizant. Complains of widespread myalgia we discussed possible causes including fibromyalgia, lupus and perimenopause. She would like to see a rheumatologist. 
 
 
Yoko Segura and Plan 1. Myalgia Will refer back to rheumatologist 
 
2. Vertebral artery dissection ([facility] [address] Remote with stable thrombus Continue coumadin 3. Chronic anticoagulation For lupus anticoagualnt 4. Anxiety Continue alprazolam 
 
5. Lupus anticoagulant and antiphospolipid syndrome Continue coumadin 6. Hypothyroidism On [place] Thyroid 7. Restless leg syndrome Continue Neurontin Allergies Patient has no allergy information on record. Medications Current Outpatient Medications Medication Sig  
 busPIRone (BUSPAR) 7.5 mg tablet Take 7.5 mg by mouth daily.  ALPRAZolam (XANAX) 0.5 mg tablet Take 1 Tab by mouth daily as needed for Anxiety.  atorvastatin (LIPITOR) 40 mg tablet Take 1 Tab by mouth daily.  ascorbic acid, vitamin C, (VITAMIN C) 1,000 mg tablet Take  by mouth.  NEXIUM 40 mg capsule TAKE ONE CAPSULE BY MOUTH EVERY DAY  venlafaxine-SR (EFFEXOR XR) 150 mg capsule Take 1 Cap by mouth daily.  ferrous sulfate 325 mg (65 mg iron) tablet TAKE 1 TABLET BY MOUTH 3 TIMES A DAY  furosemide (LASIX) 20 mg tablet TAKE 1 TABLET BY ORAL ROUTE EVERY DAY  tiZANidine (ZANAFLEX) 4 mg tablet Take 1 Tab by mouth three (3) times daily as needed.  warfarin (COUMADIN) 5 mg tablet Take 5 mg by mouth daily.  thyroid, Pork, (ARMOUR THYROID) 60 mg tablet Take 2 and 1/2 tablets by mouth in the morning No current facility-administered medications for this visit. Medical History Past Medical History:  
Diagnosis Date  Anemia  Anxiety disorder  Endometriosis 06/12/2017  Hearing reduced  Joint pain  Memory disorder  Migraine 2008  Nausea & vomiting  Poor appetite  Snoring  Stroke [facility]) 2008  Thyroid disease 1988 Review of Systems Constitutional: Positive for malaise/fatigue. Negative for chills and fever. HENT: Negative for ear pain. Eyes: Negative for pain and discharge. Respiratory: Negative for cough and hemoptysis. Cardiovascular: Negative for chest pain and claudication. Gastrointestinal: Negative for constipation and diarrhea. Genitourinary: Negative for flank pain and hematuria. Musculoskeletal: Positive for myalgias. Negative for back pain. Skin: Negative for itching and rash. Neurological: Negative for headaches. Endo/Heme/Allergies: Negative for environmental allergies. Does not bruise/bleed easily. Psychiatric/Behavioral: Positive for depression. Negative for hallucinations. The patient is nervous/anxious and has insomnia. Exam: 
 
Visit Vitals Ht 5' 2\" (1.575 m) Wt 212 lb (96.2 kg) BMI 38.78 kg/m² General: Well developed, well nourished. Appears tired and anxious Head: Normocephalic, atraumatic, anicteric sclera Neck Normal ROM, No thyromegally Lungs:  Clear to auscultation bilaterally, No wheezes or rubs Cardiac: Regular rate and rhythm with no murmurs. Ext: No pedal edema Skin: Supple no rash NeurologicExam: 
Mental Status: Alert and oriented to person place and time Speech: Fluent no aphasia or dysarthria. Cranial Nerves:  II - XII Intact Motor:  Full and symmetric strength of upper and lower proximal and distal muscles. Normal bulk and tone. Reflexes:   Deep tendon reflexes 3+/4 and symmetric. Sensory:   Symmetric and intact with no perceived deficits modalities involving small or large fibers. Gait:  Gait is balanced and fluid with normal arm swing. Tremor:   No tremor noted. Cerebellar:  Coordination intact. Neurovascular: No carotid bruits. No JVD Imaging CT Results (most recent): 
Results from Hospital Encounter encounter on 11/16/11 CT HEAD WITHOUT CONTRAST Narrative **Final Report** 
  
 
ICD Codes / Adm. Diagnosis: 780.4   / Reena Post Examination:  CT HEAD WO CON  - [id number] - Nov 16 2011 11:11AM 
Accession No:  [id number] Reason:  vertigo REPORT: 
EXAM:  CT HEAD WITHOUT CONTRAST INDICATION: Vertigo. COMPARISON: None. CONTRAST: None. TECHNIQUE: Unenhanced CT of the head was performed using 5 mm images. Brain  
and bone windows were generated. FINDINGS: 
The ventricles and sulci are normal in size, shape and configuration and  
midline. There is no significant white matter disease. There is no  
intracranial hemorrhage, extra-axial collection, mass, mass effect or  
midline shift. The basilar cisterns are open. No acute infarct is  
identified. The bone windows demonstrate no abnormalities. The visualized  
portions of the paranasal sinuses and mastoid air cells are clear. IMPRESSION: Normal unenhanced CT examination of the head. Signing/Reading Doctor: Darcie Amaya ([id number]) Approved: Darcie Amaya ([id number])  11/16/2011

## 2019-02-25 ENCOUNTER — DOCUMENTATION ONLY (OUTPATIENT)
Dept: NEUROLOGY | Age: 49
End: 2019-02-25

## 2019-04-02 DIAGNOSIS — Z86.718 HISTORY OF DVT (DEEP VEIN THROMBOSIS): Primary | ICD-10-CM

## 2019-05-09 DIAGNOSIS — F32.89 OTHER DEPRESSION: Primary | ICD-10-CM

## 2019-05-09 RX ORDER — BUSPIRONE HYDROCHLORIDE 7.5 MG/1
7.5 TABLET ORAL DAILY
Qty: 30 TAB | Refills: 3 | Status: SHIPPED | OUTPATIENT
Start: 2019-05-09 | End: 2019-09-19 | Stop reason: SDUPTHER

## 2019-05-29 ENCOUNTER — TELEPHONE (OUTPATIENT)
Dept: NEUROLOGY | Age: 49
End: 2019-05-29

## 2019-05-29 RX ORDER — PRAMIPEXOLE DIHYDROCHLORIDE 0.5 MG/1
0.5 TABLET ORAL 3 TIMES DAILY
Qty: 90 TAB | Refills: 3 | Status: SHIPPED | OUTPATIENT
Start: 2019-05-29 | End: 2021-10-25

## 2019-06-06 RX ORDER — VENLAFAXINE HYDROCHLORIDE 150 MG/1
CAPSULE, EXTENDED RELEASE ORAL
Qty: 30 CAP | Refills: 11 | Status: SHIPPED | OUTPATIENT
Start: 2019-06-06 | End: 2021-04-17

## 2019-07-15 DIAGNOSIS — E78.01 FAMILIAL HYPERCHOLESTEROLEMIA: ICD-10-CM

## 2019-07-18 RX ORDER — ATORVASTATIN CALCIUM 40 MG/1
TABLET, FILM COATED ORAL
Qty: 30 TAB | Refills: 5 | Status: SHIPPED | OUTPATIENT
Start: 2019-07-18 | End: 2022-10-31

## 2019-07-26 DIAGNOSIS — M62.838 MUSCLE SPASM: ICD-10-CM

## 2019-07-26 RX ORDER — TIZANIDINE 4 MG/1
4 TABLET ORAL
Qty: 90 TAB | Refills: 5 | Status: SHIPPED | OUTPATIENT
Start: 2019-07-26 | End: 2020-09-15 | Stop reason: SDUPTHER

## 2019-08-12 ENCOUNTER — OFFICE VISIT (OUTPATIENT)
Dept: NEUROLOGY | Age: 49
End: 2019-08-12

## 2019-08-12 VITALS
HEART RATE: 76 BPM | OXYGEN SATURATION: 98 % | DIASTOLIC BLOOD PRESSURE: 80 MMHG | WEIGHT: 206.8 LBS | BODY MASS INDEX: 38.05 KG/M2 | SYSTOLIC BLOOD PRESSURE: 118 MMHG | HEIGHT: 62 IN

## 2019-08-12 DIAGNOSIS — I63.9 CEREBELLAR STROKE (HCC): Primary | ICD-10-CM

## 2019-08-12 DIAGNOSIS — Z79.01 CHRONIC ANTICOAGULATION: ICD-10-CM

## 2019-08-12 DIAGNOSIS — I77.74 VERTEBRAL ARTERY DISSECTION (HCC): ICD-10-CM

## 2019-08-12 DIAGNOSIS — G25.81 RESTLESS LEG SYNDROME, CONTROLLED: ICD-10-CM

## 2019-08-12 DIAGNOSIS — D68.61 ANTI-PHOSPHOLIPID ANTIBODY SYNDROME (HCC): ICD-10-CM

## 2019-08-12 RX ORDER — POTASSIUM CHLORIDE 750 MG/1
TABLET, FILM COATED, EXTENDED RELEASE ORAL
COMMUNITY
End: 2021-10-25

## 2019-08-12 RX ORDER — METOPROLOL TARTRATE 50 MG/1
50 TABLET ORAL DAILY
COMMUNITY

## 2019-08-12 RX ORDER — BUMETANIDE 0.5 MG/1
TABLET ORAL DAILY
COMMUNITY
End: 2022-10-31

## 2019-08-12 NOTE — PROGRESS NOTES
Name: Juan Luis Novaopshire    Chief Complaint: Stroke    Returns for follow up. Recovering from mitral valve replacement which has taken a lot out her. She struggles with anxiety, depression and fatigue but feels that she is making improvement. Assesment and Plan  1.  cerebelllar stroke  Will refer back to rheumatologist    2. 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 Chronic anticoagulation  For lupus anticoagualnt    4. Anxiety  Continue alprazolam    5. Lupus anticoagulant and antiphospolipid syndrome  Continue coumadin    6. Hypothyroidism  On [place] Thyroid    7. Restless leg syndrome  Continue mirapex        Allergies  Patient has no allergy information on record. Medications  Current Outpatient Medications   Medication Sig    bumetanide (BUMEX) 0.5 mg tablet Take  by mouth daily.  metoprolol tartrate (LOPRESSOR) 50 mg tablet Take  by mouth two (2) times a day.  potassium chloride SR (KLOR-CON 10) 10 mEq tablet Take  by mouth.  tiZANidine (ZANAFLEX) 4 mg tablet Take 1 Tab by mouth three (3) times daily as needed for Pain.  atorvastatin (LIPITOR) 40 mg tablet TAKE 1 TABLET BY MOUTH EVERY DAY    venlafaxine-SR (EFFEXOR-XR) 150 mg capsule TAKE 1 CAPSULE BY MOUTH EVERY DAY    busPIRone (BUSPAR) 7.5 mg tablet Take 1 Tab by mouth daily.  ALPRAZolam (XANAX) 0.5 mg tablet Take 1 Tab by mouth daily as needed for Anxiety.  NEXIUM 40 mg capsule TAKE ONE CAPSULE BY MOUTH EVERY DAY    warfarin (COUMADIN) 5 mg tablet Take 5 mg by mouth daily.  thyroid, Pork, (ARMOUR THYROID) 60 mg tablet Take 2 and 1/2 tablets by mouth in the morning    pramipexole (MIRAPEX) 0.5 mg tablet Take 1 Tab by mouth three (3) times daily.  ascorbic acid, vitamin C, (VITAMIN C) 1,000 mg tablet Take  by mouth.     ferrous sulfate 325 mg (65 mg iron) tablet TAKE 1 TABLET BY MOUTH 3 TIMES A DAY    furosemide (LASIX) 20 mg tablet TAKE 1 TABLET BY ORAL ROUTE EVERY DAY No current facility-administered medications for this visit. Medical History  Past Medical History:   Diagnosis Date    Anemia     Anxiety disorder     Endometriosis 06/12/2017    Hearing reduced     Joint pain     Memory disorder     Migraine 2008    Nausea & vomiting     Poor appetite     Snoring     Stroke [facility]) 2008    Thyroid disease 1988     Review of Systems   Constitutional: Positive for malaise/fatigue. Negative for chills and fever. HENT: Negative for ear pain. Eyes: Negative for pain and discharge. Respiratory: Negative for cough and hemoptysis. Cardiovascular: Negative for chest pain and claudication. Gastrointestinal: Negative for constipation and diarrhea. Genitourinary: Negative for flank pain and hematuria. Musculoskeletal: Positive for myalgias. Negative for back pain. Skin: Negative for itching and rash. Neurological: Negative for headaches. Endo/Heme/Allergies: Negative for environmental allergies. Does not bruise/bleed easily. Psychiatric/Behavioral: Positive for depression. Negative for hallucinations. The patient is nervous/anxious and has insomnia. Exam:    Visit Vitals  /80   Pulse 76   Ht 5' 2\" (1.575 m)   Wt 206 lb 12.8 oz (93.8 kg)   SpO2 98%   BMI 37.82 kg/m²      General: Well developed, well nourished. Appears tired and anxious   Head: Normocephalic, atraumatic, anicteric sclera   Neck Normal ROM, No thyromegally   Lungs:  Clear to auscultation bilaterally, No wheezes or rubs   Cardiac: Regular rate and rhythm with no murmurs. Ext: No pedal edema   Skin: Supple no rash     NeurologicExam:  Mental Status: Alert and oriented to person place and time   Speech: Fluent no aphasia or dysarthria. Cranial Nerves:  II - XII Intact   Motor:  Full and symmetric strength of upper and lower proximal and distal muscles. Normal bulk and tone. Reflexes:   Deep tendon reflexes 3+/4 and symmetric.    Sensory:   Symmetric and intact with no perceived deficits modalities involving small or large fibers. Gait:  Gait is balanced and fluid with normal arm swing. Tremor:   No tremor noted. Cerebellar:  Coordination intact. Neurovascular: No carotid bruits. No JVD           Imaging    CT Results (most recent):  Results from Hospital Encounter encounter on 11/16/11   CT HEAD WITHOUT CONTRAST    Narrative **Final Report**       ICD Codes / Adm. Diagnosis: 780.4   / VERTIGO    Examination:  CT HEAD  CON  - [id number] - Nov 16 2011 11:11AM  Accession No:  [id number]  Reason:  vertigo      REPORT:  EXAM:  CT HEAD WITHOUT CONTRAST    INDICATION: Vertigo. COMPARISON: None. CONTRAST: None. TECHNIQUE: Unenhanced CT of the head was performed using 5 mm images. Brain   and bone windows were generated. FINDINGS:  The ventricles and sulci are normal in size, shape and configuration and   midline. There is no significant white matter disease. There is no   intracranial hemorrhage, extra-axial collection, mass, mass effect or   midline shift. The basilar cisterns are open. No acute infarct is   identified. The bone windows demonstrate no abnormalities. The visualized   portions of the paranasal sinuses and mastoid air cells are clear. IMPRESSION: Normal unenhanced CT examination of the head.              Signing/Reading Doctor: Israel Barnes ([id number])    Approved: Israel Barnes ([id number])  11/16/2011                                      45 minutes spent more than 50% spent in chart review and face to face  examination, discussion of treatment options and approach

## 2019-08-15 DIAGNOSIS — F41.9 ANXIETY: ICD-10-CM

## 2019-08-16 ENCOUNTER — DOCUMENTATION ONLY (OUTPATIENT)
Dept: NEUROLOGY | Age: 49
End: 2019-08-16

## 2019-08-16 RX ORDER — ALPRAZOLAM 0.5 MG/1
0.5 TABLET ORAL
Qty: 60 TAB | Refills: 5 | Status: SHIPPED | OUTPATIENT
Start: 2019-08-16 | End: 2020-02-12 | Stop reason: SDUPTHER

## 2019-09-19 RX ORDER — BUSPIRONE HYDROCHLORIDE 7.5 MG/1
7.5 TABLET ORAL DAILY
Qty: 30 TAB | Refills: 0 | Status: SHIPPED | OUTPATIENT
Start: 2019-09-19 | End: 2019-11-26 | Stop reason: SDUPTHER

## 2019-09-19 NOTE — TELEPHONE ENCOUNTER
Future Appointments   Date Time Provider Kp Guillermina   2/12/2020  1:40 PM Bernabe Deshpande  [facility]                         Last Appointment My Department:  8/12/2019    Would you like to refill below? Requested Prescriptions     Pending Prescriptions Disp Refills    busPIRone (BUSPAR) 7.5 mg tablet 30 Tab 0     Sig: Take 1 Tab by mouth daily.

## 2019-11-22 NOTE — TELEPHONE ENCOUNTER
Future Appointments   Date Time Provider Kp Sarmiento   2/12/2020  1:40 PM Catherine Chaidez MD [address] [place] [address]                         Last Appointment My Department:  8/12/2019    Please advise of refill below. Requested Prescriptions     Pending Prescriptions Disp Refills    busPIRone (BUSPAR) 7.5 mg tablet 30 Tab 0     Sig: Take 1 Tab by mouth daily.

## 2019-11-23 RX ORDER — BUSPIRONE HYDROCHLORIDE 7.5 MG/1
7.5 TABLET ORAL DAILY
Qty: 30 TAB | Refills: 0 | OUTPATIENT
Start: 2019-11-23

## 2019-11-26 RX ORDER — BUSPIRONE HYDROCHLORIDE 7.5 MG/1
7.5 TABLET ORAL 2 TIMES DAILY
Qty: 180 TAB | Refills: 3 | Status: SHIPPED | OUTPATIENT
Start: 2019-11-26 | End: 2021-10-25

## 2020-02-12 ENCOUNTER — OFFICE VISIT (OUTPATIENT)
Dept: NEUROLOGY | Age: 50
End: 2020-02-12

## 2020-02-12 VITALS
HEIGHT: 62 IN | OXYGEN SATURATION: 98 % | WEIGHT: 194 LBS | HEART RATE: 67 BPM | SYSTOLIC BLOOD PRESSURE: 116 MMHG | BODY MASS INDEX: 35.7 KG/M2 | DIASTOLIC BLOOD PRESSURE: 74 MMHG

## 2020-02-12 DIAGNOSIS — I77.74 VERTEBRAL ARTERY DISSECTION (HCC): Primary | ICD-10-CM

## 2020-02-12 DIAGNOSIS — Z95.2 H/O MITRAL VALVE REPLACEMENT WITH MECHANICAL VALVE: ICD-10-CM

## 2020-02-12 DIAGNOSIS — Z79.01 CHRONIC ANTICOAGULATION: ICD-10-CM

## 2020-02-12 DIAGNOSIS — D68.61 ANTI-PHOSPHOLIPID ANTIBODY SYNDROME (HCC): ICD-10-CM

## 2020-02-12 DIAGNOSIS — I63.9 CEREBELLAR STROKE (HCC): ICD-10-CM

## 2020-02-12 DIAGNOSIS — F41.9 ANXIETY: ICD-10-CM

## 2020-02-12 RX ORDER — ALPRAZOLAM 0.5 MG/1
0.5 TABLET ORAL
Qty: 60 TAB | Refills: 5 | Status: SHIPPED | OUTPATIENT
Start: 2020-02-12 | End: 2021-07-30

## 2020-02-12 RX ORDER — DULOXETIN HYDROCHLORIDE 60 MG/1
60 CAPSULE, DELAYED RELEASE ORAL DAILY
Qty: 30 CAP | Refills: 3 | Status: SHIPPED | OUTPATIENT
Start: 2020-02-12 | End: 2020-08-27

## 2020-02-12 NOTE — PROGRESS NOTES
Name: Haydee Pham    Chief Complaint: Stroke    Returns for follow-up visit. Since her mitral valve replacement the patient has not been feeling herself. She has severe anxiety which has been refractory to antidepressants. She uses the Xanax sparingly as instructed. She has widespread myalgia. She is compliant with her medications and has no new weakness or sensory loss. She has no specific fears and has adjusted well since the surgery however is having trouble sleeping. She has a remote history of Hashimoto's thyroiditis. Assesment and Plan  1. 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 Chronic anticoagulation  Mitral valve replacement and lupus anticoagulant    4. Anxiety  Continue alprazolam  Will stop BuSpar  Replace Effexor with duloxetine    5. Lupus anticoagulant and antiphospolipid syndrome  Continue coumadin    6. Hypothyroidism  On [place] Thyroid  antiperoxidase enzyme     7. Restless leg syndrome  Continue mirapex        Allergies  Patient has no known allergies. Medications  Current Outpatient Medications   Medication Sig    busPIRone (BUSPAR) 7.5 mg tablet Take 1 Tab by mouth two (2) times a day.  bumetanide (BUMEX) 0.5 mg tablet Take  by mouth daily.  metoprolol tartrate (LOPRESSOR) 50 mg tablet Take  by mouth two (2) times a day.  tiZANidine (ZANAFLEX) 4 mg tablet Take 1 Tab by mouth three (3) times daily as needed for Pain.  atorvastatin (LIPITOR) 40 mg tablet TAKE 1 TABLET BY MOUTH EVERY DAY    venlafaxine-SR (EFFEXOR-XR) 150 mg capsule TAKE 1 CAPSULE BY MOUTH EVERY DAY    NEXIUM 40 mg capsule TAKE ONE CAPSULE BY MOUTH EVERY DAY    warfarin (COUMADIN) 5 mg tablet Take 5 mg by mouth daily.     thyroid, Pork, (ARMOUR THYROID) 60 mg tablet Take 2 and 1/2 tablets by mouth in the morning    ALPRAZolam (XANAX) 0.5 mg tablet Take 1 Tab by mouth two (2) times daily as needed for Anxiety. Max Daily Amount: 1 mg.  potassium chloride SR (KLOR-CON 10) 10 mEq tablet Take  by mouth.  pramipexole (MIRAPEX) 0.5 mg tablet Take 1 Tab by mouth three (3) times daily.  ascorbic acid, vitamin C, (VITAMIN C) 1,000 mg tablet Take  by mouth.  ferrous sulfate 325 mg (65 mg iron) tablet TAKE 1 TABLET BY MOUTH 3 TIMES A DAY    furosemide (LASIX) 20 mg tablet TAKE 1 TABLET BY ORAL ROUTE EVERY DAY     No current facility-administered medications for this visit. Medical History  Past Medical History:   Diagnosis Date    Anemia     Anxiety disorder     Endometriosis 06/12/2017    Hearing reduced     Joint pain     Memory disorder     Migraine 2008    Nausea & vomiting     Poor appetite     Snoring     Stroke [facility]) 2008    Thyroid disease 1988     Review of Systems   Constitutional: Positive for malaise/fatigue. Negative for chills and fever. HENT: Negative for ear pain. Eyes: Negative for pain and discharge. Respiratory: Negative for cough and hemoptysis. Cardiovascular: Negative for chest pain and claudication. Gastrointestinal: Negative for constipation and diarrhea. Genitourinary: Negative for flank pain and hematuria. Musculoskeletal: Positive for myalgias. Negative for back pain. Skin: Negative for itching and rash. Neurological: Negative for headaches. Endo/Heme/Allergies: Negative for environmental allergies. Does not bruise/bleed easily. Psychiatric/Behavioral: Positive for depression. Negative for hallucinations. The patient is nervous/anxious and has insomnia. Exam:    Visit Vitals  /74   Pulse 67   Ht 5' 2\" (1.575 m)   Wt 194 lb (88 kg)   SpO2 98%   BMI 35.48 kg/m²      General: Well developed, well nourished. Appears tired and anxious   Head: Normocephalic, atraumatic, anicteric sclera   Neck Normal ROM, No thyromegally   Lungs:  Clear to auscultation bilaterally, No wheezes or rubs   Cardiac: Regular rate and rhythm with no murmurs. Ext: No pedal edema   Skin: Supple no rash     NeurologicExam:  Mental Status: Alert and oriented to person place and time   Speech: Fluent no aphasia or dysarthria. Cranial Nerves:  II - XII Intact   Motor:  Full and symmetric strength of upper and lower proximal and distal muscles. Normal bulk and tone. Reflexes:   Deep tendon reflexes 3+/4 and symmetric. Sensory:   Symmetric and intact with no perceived deficits modalities involving small or large fibers. Gait:  Gait is balanced and fluid with normal arm swing. Tremor:   No tremor noted. Cerebellar:  Coordination intact. Neurovascular: No carotid bruits. No JVD           Imaging    CT Results (most recent):  Results from Hospital Encounter encounter on 11/16/11   CT HEAD WITHOUT CONTRAST    Narrative **Final Report**       ICD Codes / Adm. Diagnosis: 780.4   / VERTIGO    Examination:  CT HEAD  CON  - [id number] - Nov 16 2011 11:11AM  Accession No:  [id number]  Reason:  vertigo      REPORT:  EXAM:  CT HEAD WITHOUT CONTRAST    INDICATION: Vertigo. COMPARISON: None. CONTRAST: None. TECHNIQUE: Unenhanced CT of the head was performed using 5 mm images. Brain   and bone windows were generated. FINDINGS:  The ventricles and sulci are normal in size, shape and configuration and   midline. There is no significant white matter disease. There is no   intracranial hemorrhage, extra-axial collection, mass, mass effect or   midline shift. The basilar cisterns are open. No acute infarct is   identified. The bone windows demonstrate no abnormalities. The visualized   portions of the paranasal sinuses and mastoid air cells are clear. IMPRESSION: Normal unenhanced CT examination of the head.              Signing/Reading Doctor: Umair Rao ([id number])    Approved: Umair Rao ([id number])  11/16/2011                                    45 minutes spent more than 50% spent in chart review and face to face  examination, discussion of treatment options and approach

## 2020-02-21 LAB — THYROPEROXIDASE AB SERPL-ACNC: 212 IU/ML (ref 0–34)

## 2020-03-24 RX ORDER — BUTALBITAL, ACETAMINOPHEN AND CAFFEINE 50; 325; 40 MG/1; MG/1; MG/1
TABLET ORAL
Qty: 30 TAB | Refills: 2 | Status: SHIPPED | OUTPATIENT
Start: 2020-03-24 | End: 2021-10-25

## 2020-03-24 NOTE — PROGRESS NOTES
zoraidaeint called with severe migraine for four days refractory to tylenol.  Fioricet sent to [facility]

## 2020-08-27 RX ORDER — DULOXETIN HYDROCHLORIDE 60 MG/1
CAPSULE, DELAYED RELEASE ORAL
Qty: 90 CAP | Refills: 3 | Status: SHIPPED | OUTPATIENT
Start: 2020-08-27 | End: 2021-03-30 | Stop reason: SDUPTHER

## 2020-09-14 ENCOUNTER — TELEPHONE (OUTPATIENT)
Dept: NEUROLOGY | Age: 50
End: 2020-09-14

## 2020-09-14 DIAGNOSIS — M62.838 MUSCLE SPASM: ICD-10-CM

## 2020-09-15 DIAGNOSIS — M62.838 MUSCLE SPASM: ICD-10-CM

## 2020-09-15 RX ORDER — TIZANIDINE 4 MG/1
4 TABLET ORAL
Qty: 90 TAB | Refills: 5 | Status: SHIPPED | OUTPATIENT
Start: 2020-09-15 | End: 2021-10-25

## 2021-03-30 ENCOUNTER — OFFICE VISIT (OUTPATIENT)
Dept: NEUROLOGY | Age: 51
End: 2021-03-30
Payer: COMMERCIAL

## 2021-03-30 VITALS
HEART RATE: 80 BPM | WEIGHT: 194.6 LBS | TEMPERATURE: 97.3 F | DIASTOLIC BLOOD PRESSURE: 64 MMHG | BODY MASS INDEX: 35.81 KG/M2 | HEIGHT: 62 IN | RESPIRATION RATE: 16 BRPM | SYSTOLIC BLOOD PRESSURE: 98 MMHG

## 2021-03-30 DIAGNOSIS — I77.74 VERTEBRAL ARTERY DISSECTION (HCC): ICD-10-CM

## 2021-03-30 DIAGNOSIS — F41.9 ANXIETY: Primary | ICD-10-CM

## 2021-03-30 DIAGNOSIS — G25.81 RESTLESS LEG SYNDROME, CONTROLLED: ICD-10-CM

## 2021-03-30 DIAGNOSIS — I63.9 CEREBELLAR STROKE (HCC): ICD-10-CM

## 2021-03-30 DIAGNOSIS — D68.61 ANTI-PHOSPHOLIPID ANTIBODY SYNDROME (HCC): ICD-10-CM

## 2021-03-30 DIAGNOSIS — Z95.2 H/O MITRAL VALVE REPLACEMENT WITH MECHANICAL VALVE: ICD-10-CM

## 2021-03-30 PROCEDURE — 99215 OFFICE O/P EST HI 40 MIN: CPT | Performed by: PSYCHIATRY & NEUROLOGY

## 2021-03-30 RX ORDER — DULOXETIN HYDROCHLORIDE 30 MG/1
30 CAPSULE, DELAYED RELEASE ORAL DAILY
Qty: 30 CAP | Refills: 1 | Status: SHIPPED | OUTPATIENT
Start: 2021-03-30 | End: 2021-04-17

## 2021-03-30 RX ORDER — FLUOXETINE 10 MG/1
10 TABLET ORAL DAILY
Qty: 30 TAB | Refills: 3 | Status: SHIPPED | OUTPATIENT
Start: 2021-03-30 | End: 2021-04-17 | Stop reason: SDUPTHER

## 2021-03-30 RX ORDER — GABAPENTIN 100 MG/1
CAPSULE ORAL
COMMUNITY
Start: 2021-01-04 | End: 2021-10-25 | Stop reason: SDUPTHER

## 2021-03-30 NOTE — PROGRESS NOTES
Chief Complaint   Patient presents with    Follow-up     states that he thought she had ptsd and is weening off one of the medication and is very irritable and has been not sure if it is med or menopause and has brain fog

## 2021-03-30 NOTE — PROGRESS NOTES
Name: Efren Ernst    Chief Complaint: Stroke    Patient returns for follow-up visit. She has been having a difficult time emotionally since her mitral valve replacement. She has been exceedingly depressed and anxious. She is struggling to adjust.  This was complicated by several traumatic incidents that occurred to close family members. She feels that the duloxetine has exacerbated her anxiety. She is reluctant to return to [facility]. We discussed potentially using Prozac. She is currently in counseling. He has no new sensory loss or weakness. Returns for follow-up visit. Since her mitral valve replacement the patient has not been feeling herself. She has severe anxiety which has been refractory to antidepressants. She uses the Xanax sparingly as instructed. She has widespread myalgia. She is compliant with her medications and has no new weakness or sensory loss. She has no specific fears and has adjusted well since the surgery however is having trouble sleeping. She has a remote history of Hashimoto's thyroiditis. Assesment and Plan  1. 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 Chronic anticoagulation  Mitral valve replacement and lupus anticoagulant    4. Anxiety  Continue alprazolam  Will stop BuSpar  Wean off duloxetine and start Prozac    5. Lupus anticoagulant and antiphospolipid syndrome  Continue coumadin    6. Hypothyroidism  On [place] Thyroid  antiperoxidase enzyme     7. Restless leg syndrome  Continue mirapex        Allergies  Patient has no known allergies. Medications  Current Outpatient Medications   Medication Sig    gabapentin (NEURONTIN) 100 mg capsule TAKE 1 CAPSULE BY MOUTH EVERY DAY    DULoxetine (CYMBALTA) 60 mg capsule TAKE 1 CAPSULE BY MOUTH EVERY DAY    ALPRAZolam (XANAX) 0.5 mg tablet Take 1 Tab by mouth two (2) times daily as needed for Anxiety.  Max Daily Amount: 1 mg.    bumetanide (BUMEX) 0.5 mg tablet Take  by mouth daily.  metoprolol tartrate (LOPRESSOR) 50 mg tablet Take  by mouth two (2) times a day.  potassium chloride SR (KLOR-CON 10) 10 mEq tablet Take  by mouth.  atorvastatin (LIPITOR) 40 mg tablet TAKE 1 TABLET BY MOUTH EVERY DAY    ascorbic acid, vitamin C, (VITAMIN C) 1,000 mg tablet Take  by mouth.  NEXIUM 40 mg capsule TAKE ONE CAPSULE BY MOUTH EVERY DAY    warfarin (COUMADIN) 5 mg tablet Take 5 mg by mouth daily.  thyroid, Pork, (ARMOUR THYROID) 60 mg tablet Take 2 and 1/2 tablets by mouth in the morning    tiZANidine (ZANAFLEX) 4 mg tablet Take 1 Tab by mouth three (3) times daily as needed for Pain.  butalbital-acetaminophen-caffeine (FIORICET, ESGIC) -40 mg per tablet Use one to two every 40 minutes (no more than 4 capsules over 24 hours). No more than ten days a month    busPIRone (BUSPAR) 7.5 mg tablet Take 1 Tab by mouth two (2) times a day.  venlafaxine-SR (EFFEXOR-XR) 150 mg capsule TAKE 1 CAPSULE BY MOUTH EVERY DAY    pramipexole (MIRAPEX) 0.5 mg tablet Take 1 Tab by mouth three (3) times daily.  ferrous sulfate 325 mg (65 mg iron) tablet TAKE 1 TABLET BY MOUTH 3 TIMES A DAY    furosemide (LASIX) 20 mg tablet TAKE 1 TABLET BY ORAL ROUTE EVERY DAY     No current facility-administered medications for this visit. Medical History  Past Medical History:   Diagnosis Date    Anemia     Anxiety disorder     Endometriosis 06/12/2017    Hearing reduced     Joint pain     Memory disorder     Migraine 2008    Nausea & vomiting     Poor appetite     Snoring     Stroke [facility]) 2008    Thyroid disease 1988     Review of Systems   Constitutional: Positive for malaise/fatigue. Negative for chills and fever. HENT: Negative for ear pain. Eyes: Negative for pain and discharge. Respiratory: Negative for cough and hemoptysis. Cardiovascular: Negative for chest pain and claudication.    Gastrointestinal: Negative for constipation and diarrhea. Genitourinary: Negative for flank pain and hematuria. Musculoskeletal: Positive for myalgias. Negative for back pain. Skin: Negative for itching and rash. Neurological: Negative for headaches. Endo/Heme/Allergies: Negative for environmental allergies. Does not bruise/bleed easily. Psychiatric/Behavioral: Positive for depression. Negative for hallucinations. The patient is nervous/anxious and has insomnia. Exam:    Visit Vitals  BP 98/64 (BP 1 Location: Left upper arm, BP Patient Position: Sitting, BP Cuff Size: Adult)   Pulse 80   Temp 97.3 °F (36.3 °C) (Temporal)   Resp 16   Ht 5' 2\" (1.575 m)   Wt 194 lb 9.6 oz (88.3 kg)   BMI 35.59 kg/m²      General: Well developed, well nourished. Appears tired and anxious   Head: Normocephalic, atraumatic, anicteric sclera   Neck Normal ROM, No thyromegally   Lungs:  Clear to auscultation bilaterally, No wheezes or rubs   Cardiac: Regular rate and rhythm with no murmurs. Ext: No pedal edema   Skin: Supple no rash     NeurologicExam:  Mental Status: Alert and oriented to person place and time   Speech: Fluent no aphasia or dysarthria. Cranial Nerves:  II - XII Intact   Motor:  Full and symmetric strength of upper and lower proximal and distal muscles. Normal bulk and tone. Reflexes:   Deep tendon reflexes 3+/4 and symmetric. Sensory:   Symmetric and intact with no perceived deficits modalities involving small or large fibers. Gait:  Gait is balanced and fluid with normal arm swing. Tremor:   No tremor noted. Cerebellar:  Coordination intact. Neurovascular: No carotid bruits. No JVD           Imaging    CT Results (most recent):  Results from Hospital Encounter encounter on 11/16/11   CT HEAD WITHOUT CONTRAST    Narrative **Final Report**       ICD Codes / Adm. Diagnosis: 780.4   / VERTIGO    Examination:  CT HEAD  CON  - [id number] - Nov 16 2011 11:11AM  Accession No:  [id number]  Reason: vertigo      REPORT:  EXAM:  CT HEAD WITHOUT CONTRAST    INDICATION: Vertigo. COMPARISON: None. CONTRAST: None. TECHNIQUE: Unenhanced CT of the head was performed using 5 mm images. Brain   and bone windows were generated. FINDINGS:  The ventricles and sulci are normal in size, shape and configuration and   midline. There is no significant white matter disease. There is no   intracranial hemorrhage, extra-axial collection, mass, mass effect or   midline shift. The basilar cisterns are open. No acute infarct is   identified. The bone windows demonstrate no abnormalities. The visualized   portions of the paranasal sinuses and mastoid air cells are clear. IMPRESSION: Normal unenhanced CT examination of the head.              Signing/Reading Doctor: Elizabeth Rae ([id number])    Approved: Elizabeth Rae ([id number])  11/16/2011                                    45 minutes spent more than 50% spent in chart review and face to face  examination, discussion of treatment options and approach

## 2021-04-17 DIAGNOSIS — F41.9 ANXIETY: ICD-10-CM

## 2021-04-17 RX ORDER — FLUOXETINE 20 MG/1
20 TABLET ORAL DAILY
Qty: 30 TAB | Refills: 3 | Status: SHIPPED | OUTPATIENT
Start: 2021-04-17 | End: 2021-08-16

## 2021-07-27 DIAGNOSIS — F41.9 ANXIETY: ICD-10-CM

## 2021-07-30 RX ORDER — ALPRAZOLAM 0.5 MG/1
0.5 TABLET ORAL
Qty: 60 TABLET | Refills: 5 | Status: SHIPPED | OUTPATIENT
Start: 2021-07-30 | End: 2021-10-25

## 2021-08-14 DIAGNOSIS — F41.9 ANXIETY: ICD-10-CM

## 2021-08-16 RX ORDER — FLUOXETINE 20 MG/1
TABLET ORAL
Qty: 90 TABLET | Refills: 3 | Status: SHIPPED | OUTPATIENT
Start: 2021-08-16 | End: 2022-08-15 | Stop reason: SDUPTHER

## 2021-10-25 ENCOUNTER — TELEPHONE (OUTPATIENT)
Dept: NEUROLOGY | Age: 51
End: 2021-10-25

## 2021-10-25 ENCOUNTER — OFFICE VISIT (OUTPATIENT)
Dept: NEUROLOGY | Age: 51
End: 2021-10-25
Payer: COMMERCIAL

## 2021-10-25 VITALS
OXYGEN SATURATION: 98 % | HEART RATE: 58 BPM | HEIGHT: 62 IN | WEIGHT: 194 LBS | BODY MASS INDEX: 35.7 KG/M2 | DIASTOLIC BLOOD PRESSURE: 78 MMHG | TEMPERATURE: 97.5 F | RESPIRATION RATE: 18 BRPM | SYSTOLIC BLOOD PRESSURE: 106 MMHG

## 2021-10-25 DIAGNOSIS — G25.81 RESTLESS LEG SYNDROME, CONTROLLED: ICD-10-CM

## 2021-10-25 DIAGNOSIS — M79.7 FIBROMYALGIA: ICD-10-CM

## 2021-10-25 DIAGNOSIS — I63.9 CEREBELLAR STROKE (HCC): ICD-10-CM

## 2021-10-25 DIAGNOSIS — G25.81 RESTLESS LEG SYNDROME, CONTROLLED: Primary | ICD-10-CM

## 2021-10-25 DIAGNOSIS — I77.74 VERTEBRAL ARTERY DISSECTION (HCC): ICD-10-CM

## 2021-10-25 DIAGNOSIS — F41.9 ANXIETY: ICD-10-CM

## 2021-10-25 DIAGNOSIS — Z79.01 CHRONIC ANTICOAGULATION: ICD-10-CM

## 2021-10-25 PROCEDURE — 99214 OFFICE O/P EST MOD 30 MIN: CPT | Performed by: PSYCHIATRY & NEUROLOGY

## 2021-10-25 RX ORDER — LEVOTHYROXINE SODIUM 175 UG/1
TABLET ORAL
COMMUNITY
Start: 2021-09-13 | End: 2022-10-31

## 2021-10-25 RX ORDER — GABAPENTIN 100 MG/1
300 CAPSULE ORAL 3 TIMES DAILY
Qty: 90 CAPSULE | Refills: 3 | Status: SHIPPED | OUTPATIENT
Start: 2021-10-25 | End: 2021-10-25 | Stop reason: DRUGHIGH

## 2021-10-25 RX ORDER — GABAPENTIN 300 MG/1
300 CAPSULE ORAL 3 TIMES DAILY
Qty: 90 CAPSULE | Refills: 2 | Status: CANCELLED | OUTPATIENT
Start: 2021-10-25 | End: 2022-01-23

## 2021-10-25 NOTE — PROGRESS NOTES
Name: Eloisa Mota    Chief Complaint: Stroke    Depression is stable. FIbromyagia stable. Restless legs are \"off the chain\". She has no new symptoms of stroke. Anxiety remains an issue. She could not find a therapist.     Returns for follow-up visit. Since her mitral valve replacement the patient has not been feeling herself. She has severe anxiety which has been refractory to antidepressants. She uses the Xanax sparingly as instructed. She has widespread myalgia. She is compliant with her medications and has no new weakness or sensory loss. She has no specific fears and has adjusted well since the surgery however is having trouble sleeping. She has a remote history of Hashimoto's thyroiditis. Assesment and Plan  1. 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 Chronic anticoagulation  Mitral valve replacement and lupus anticoagulant    4. Anxiety  Continue alprazolam  Will stop BuSpar  Wean off duloxetine and start Prozac    5. Lupus anticoagulant and antiphospolipid syndrome  Continue coumadin    6. Hypothyroidism  On [place] Thyroid  antiperoxidase enzyme +    7. Restless leg syndrome  Continue mirapex        Allergies  Soy     Medications  Current Outpatient Medications   Medication Sig    levothyroxine (Synthroid) 175 mcg tablet 1 tablet in the morning on an empty stomach    FLUoxetine (PROzac) 20 mg tablet TAKE 1 TABLET BY MOUTH EVERY DAY    gabapentin (NEURONTIN) 100 mg capsule TAKE 1 CAPSULE BY MOUTH EVERY DAY    bumetanide (BUMEX) 0.5 mg tablet Take  by mouth daily.  metoprolol tartrate (LOPRESSOR) 50 mg tablet Take  by mouth two (2) times a day.  atorvastatin (LIPITOR) 40 mg tablet TAKE 1 TABLET BY MOUTH EVERY DAY    ascorbic acid, vitamin C, (VITAMIN C) 1,000 mg tablet Take  by mouth.     NEXIUM 40 mg capsule TAKE ONE CAPSULE BY MOUTH EVERY DAY    warfarin (COUMADIN) 5 mg tablet Take 5 mg by mouth daily.  ALPRAZolam (XANAX) 0.5 mg tablet TAKE 1 TAB BY MOUTH TWO (2) TIMES DAILY AS NEEDED FOR ANXIETY. MAX DAILY AMOUNT: 1 MG. (Patient not taking: Reported on 10/25/2021)    tiZANidine (ZANAFLEX) 4 mg tablet Take 1 Tab by mouth three (3) times daily as needed for Pain. (Patient not taking: Reported on 10/25/2021)    butalbital-acetaminophen-caffeine (FIORICET, ESGIC) -40 mg per tablet Use one to two every 40 minutes (no more than 4 capsules over 24 hours). No more than ten days a month (Patient not taking: Reported on 10/25/2021)    busPIRone (BUSPAR) 7.5 mg tablet Take 1 Tab by mouth two (2) times a day. (Patient not taking: Reported on 10/25/2021)    potassium chloride SR (KLOR-CON 10) 10 mEq tablet Take  by mouth. (Patient not taking: Reported on 10/25/2021)    pramipexole (MIRAPEX) 0.5 mg tablet Take 1 Tab by mouth three (3) times daily. (Patient not taking: Reported on 10/25/2021)    ferrous sulfate 325 mg (65 mg iron) tablet TAKE 1 TABLET BY MOUTH 3 TIMES A DAY (Patient not taking: Reported on 10/25/2021)    furosemide (LASIX) 20 mg tablet TAKE 1 TABLET BY ORAL ROUTE EVERY DAY (Patient not taking: Reported on 10/25/2021)    thyroid, Pork, (ARMOUR THYROID) 60 mg tablet Take 2 and 1/2 tablets by mouth in the morning (Patient not taking: Reported on 10/25/2021)     No current facility-administered medications for this visit. Medical History  Past Medical History:   Diagnosis Date    Anemia     Anxiety disorder     Endometriosis 06/12/2017    Hearing reduced     Joint pain     Memory disorder     Migraine 2008    Nausea & vomiting     Poor appetite     Snoring     Stroke [facility]) 2008    Thyroid disease 1988     Review of Systems   Constitutional: Positive for malaise/fatigue. Negative for chills and fever. HENT: Negative for ear pain. Eyes: Negative for pain and discharge. Respiratory: Negative for cough and hemoptysis.     Cardiovascular: Negative for chest pain and claudication. Gastrointestinal: Negative for constipation and diarrhea. Genitourinary: Negative for flank pain and hematuria. Musculoskeletal: Positive for myalgias. Negative for back pain. Skin: Negative for itching and rash. Neurological: Negative for headaches. Endo/Heme/Allergies: Negative for environmental allergies. Does not bruise/bleed easily. Psychiatric/Behavioral: Positive for depression. Negative for hallucinations. The patient is nervous/anxious and has insomnia. Exam:    Visit Vitals  /78 (BP 1 Location: Left arm, BP Patient Position: Sitting, BP Cuff Size: Adult)   Pulse (!) 58   Temp 97.5 °F (36.4 °C) (Temporal)   Resp 18   Ht 5' 2\" (1.575 m)   Wt 194 lb (88 kg)   SpO2 98%   BMI 35.48 kg/m²      General: Well developed, well nourished. Appears tired and anxious   Head: Normocephalic, atraumatic, anicteric sclera   Neck Normal ROM, No thyromegally   Lungs:  Clear to auscultation bilaterally, No wheezes or rubs   Cardiac: Regular rate and rhythm with no murmurs. Ext: No pedal edema   Skin: Supple no rash     NeurologicExam:  Mental Status: Alert and oriented to person place and time   Speech: Fluent no aphasia or dysarthria. Cranial Nerves:  II - XII Intact   Motor:  Full and symmetric strength of upper and lower proximal and distal muscles. Normal bulk and tone. Reflexes:   Deep tendon reflexes 3+/4 and symmetric. Sensory:   Symmetric and intact with no perceived deficits modalities involving small or large fibers. Gait:  Gait is balanced and fluid with normal arm swing. Tremor:   No tremor noted. Cerebellar:  Coordination intact. Neurovascular: No carotid bruits. No JVD           Imaging    CT Results (most recent):  Results from Hospital Encounter encounter on 11/16/11    CT HEAD WITHOUT CONTRAST    Narrative  **Final Report**      ICD Codes / Adm. Diagnosis: 780.4   / VERTIGO  Examination:  CT HEAD  CON  - [id number] - Nov 16 2011 11:11AM  Accession No: [id number]  Reason:  vertigo      REPORT:  EXAM:  CT HEAD WITHOUT CONTRAST    INDICATION: Vertigo. COMPARISON: None. CONTRAST: None. TECHNIQUE: Unenhanced CT of the head was performed using 5 mm images. Brain  and bone windows were generated. FINDINGS:  The ventricles and sulci are normal in size, shape and configuration and  midline. There is no significant white matter disease. There is no  intracranial hemorrhage, extra-axial collection, mass, mass effect or  midline shift. The basilar cisterns are open. No acute infarct is  identified. The bone windows demonstrate no abnormalities. The visualized  portions of the paranasal sinuses and mastoid air cells are clear. IMPRESSION: Normal unenhanced CT examination of the head.         Signing/Reading Doctor: Pedro Luis Gardner ([id number])  Approved: Pedro Luis Gardner ([id number])  11/16/2011    45 minutes spent more than 50% spent in chart review and face to face  examination, discussion of treatment options and approach

## 2021-10-25 NOTE — PROGRESS NOTES
Chief Complaint   Patient presents with    Follow-up     increased pain in knees due to fibromyalgia.  New onset of headaches

## 2021-10-26 DIAGNOSIS — F41.9 ANXIETY: ICD-10-CM

## 2021-10-26 DIAGNOSIS — G25.81 RESTLESS LEG SYNDROME, CONTROLLED: Primary | ICD-10-CM

## 2021-10-26 RX ORDER — ALPRAZOLAM 0.5 MG/1
0.5 TABLET ORAL
Qty: 60 TABLET | Refills: 5 | Status: SHIPPED | OUTPATIENT
Start: 2021-10-26 | End: 2022-10-31 | Stop reason: SDUPTHER

## 2021-10-26 RX ORDER — GABAPENTIN 300 MG/1
300 CAPSULE ORAL 3 TIMES DAILY
Qty: 90 CAPSULE | Refills: 5 | Status: SHIPPED | OUTPATIENT
Start: 2021-10-26 | End: 2022-10-31

## 2022-03-18 PROBLEM — F41.1 GENERALIZED ANXIETY DISORDER: Status: ACTIVE | Noted: 2017-09-06

## 2022-03-19 PROBLEM — R76.0 LUPUS ANTICOAGULANT POSITIVE: Status: ACTIVE | Noted: 2018-10-11

## 2022-03-19 PROBLEM — M62.838 MUSCLE SPASM: Status: ACTIVE | Noted: 2017-09-06

## 2022-03-19 PROBLEM — I77.74 VERTEBRAL ARTERY DISSECTION (HCC): Status: ACTIVE | Noted: 2017-09-06

## 2022-03-19 PROBLEM — N80.9 ENDOMETRIOSIS: Status: ACTIVE | Noted: 2017-09-06

## 2022-03-19 PROBLEM — D50.0 IRON DEFICIENCY ANEMIA DUE TO CHRONIC BLOOD LOSS: Status: ACTIVE | Noted: 2017-09-06

## 2022-03-19 PROBLEM — I10 ESSENTIAL HYPERTENSION: Status: ACTIVE | Noted: 2017-09-06

## 2022-03-19 PROBLEM — Z79.01 CHRONIC ANTICOAGULATION: Status: ACTIVE | Noted: 2017-09-06

## 2022-03-20 PROBLEM — I63.9 CEREBELLAR STROKE (HCC): Status: ACTIVE | Noted: 2017-09-06

## 2022-03-20 PROBLEM — E66.01 SEVERE OBESITY (HCC): Status: ACTIVE | Noted: 2019-02-11

## 2022-03-20 PROBLEM — D68.61 ANTI-PHOSPHOLIPID ANTIBODY SYNDROME (HCC): Status: ACTIVE | Noted: 2018-10-11

## 2022-08-15 DIAGNOSIS — F41.9 ANXIETY: ICD-10-CM

## 2022-08-15 RX ORDER — FLUOXETINE 20 MG/1
20 TABLET ORAL DAILY
Qty: 90 TABLET | Refills: 0 | Status: SHIPPED | OUTPATIENT
Start: 2022-08-15 | End: 2022-10-31 | Stop reason: SDUPTHER

## 2022-10-31 ENCOUNTER — OFFICE VISIT (OUTPATIENT)
Dept: NEUROLOGY | Age: 52
End: 2022-10-31
Payer: COMMERCIAL

## 2022-10-31 ENCOUNTER — DOCUMENTATION ONLY (OUTPATIENT)
Dept: NEUROLOGY | Age: 52
End: 2022-10-31

## 2022-10-31 VITALS
WEIGHT: 161 LBS | TEMPERATURE: 97.3 F | DIASTOLIC BLOOD PRESSURE: 82 MMHG | RESPIRATION RATE: 15 BRPM | OXYGEN SATURATION: 98 % | HEART RATE: 70 BPM | SYSTOLIC BLOOD PRESSURE: 108 MMHG | HEIGHT: 62 IN | BODY MASS INDEX: 29.63 KG/M2

## 2022-10-31 DIAGNOSIS — D68.61 ANTI-PHOSPHOLIPID ANTIBODY SYNDROME (HCC): Primary | ICD-10-CM

## 2022-10-31 DIAGNOSIS — I77.74 VERTEBRAL ARTERY DISSECTION (HCC): ICD-10-CM

## 2022-10-31 DIAGNOSIS — F41.9 ANXIETY: ICD-10-CM

## 2022-10-31 DIAGNOSIS — M79.7 PRIMARY FIBROMYALGIA SYNDROME: ICD-10-CM

## 2022-10-31 DIAGNOSIS — G25.81 RESTLESS LEG SYNDROME, CONTROLLED: ICD-10-CM

## 2022-10-31 DIAGNOSIS — I63.9 CEREBELLAR STROKE (HCC): ICD-10-CM

## 2022-10-31 DIAGNOSIS — Z79.01 CHRONIC ANTICOAGULATION: ICD-10-CM

## 2022-10-31 PROBLEM — F48.9 NEUROSIS: Status: ACTIVE | Noted: 2022-10-31

## 2022-10-31 PROBLEM — E66.9 CLASS 2 OBESITY: Status: ACTIVE | Noted: 2022-10-31

## 2022-10-31 PROBLEM — E78.5 HYPERLIPIDEMIA LDL GOAL <130: Status: ACTIVE | Noted: 2022-10-31

## 2022-10-31 PROBLEM — K21.9 GERD (GASTROESOPHAGEAL REFLUX DISEASE): Status: ACTIVE | Noted: 2022-10-31

## 2022-10-31 PROBLEM — I50.9 CHF (CONGESTIVE HEART FAILURE) (HCC): Status: ACTIVE | Noted: 2022-10-31

## 2022-10-31 PROBLEM — E06.3 AUTOIMMUNE THYROIDITIS: Status: ACTIVE | Noted: 2022-10-31

## 2022-10-31 PROBLEM — F32.A DEPRESSION: Status: ACTIVE | Noted: 2022-10-31

## 2022-10-31 PROBLEM — E03.9 HYPOTHYROIDISM: Status: ACTIVE | Noted: 2022-10-31

## 2022-10-31 PROBLEM — Z95.2 PRESENCE OF PROSTHETIC HEART VALVE: Status: ACTIVE | Noted: 2022-10-31

## 2022-10-31 PROCEDURE — 99215 OFFICE O/P EST HI 40 MIN: CPT | Performed by: PSYCHIATRY & NEUROLOGY

## 2022-10-31 PROCEDURE — 3078F DIAST BP <80 MM HG: CPT | Performed by: PSYCHIATRY & NEUROLOGY

## 2022-10-31 PROCEDURE — 3074F SYST BP LT 130 MM HG: CPT | Performed by: PSYCHIATRY & NEUROLOGY

## 2022-10-31 RX ORDER — ASPIRIN 81 MG/1
81 TABLET ORAL DAILY
COMMUNITY

## 2022-10-31 RX ORDER — FLUOXETINE 20 MG/1
20 TABLET ORAL DAILY
Qty: 90 TABLET | Refills: 3 | Status: SHIPPED | OUTPATIENT
Start: 2022-10-31

## 2022-10-31 RX ORDER — ALPRAZOLAM 0.5 MG/1
0.5 TABLET ORAL
Qty: 60 TABLET | Refills: 0 | Status: SHIPPED | OUTPATIENT
Start: 2022-10-31

## 2022-10-31 RX ORDER — THYROID, PORCINE 120 MG/1
120 TABLET ORAL DAILY
COMMUNITY
Start: 2022-09-16

## 2022-10-31 NOTE — LETTER
10/31/2022    Patient: Darrold Clonts   YOB: 1970   Date of Visit: 10/31/2022     Becky Kennedy MD  [address] [place] [phone]  Via Fax: [phone]    Dear Becky Kennedy MD,      Thank you for referring Ms. Elmer Lazcano to [address] [place] [address] for evaluation. My notes for this consultation are attached. If you have questions, please do not hesitate to call me. I look forward to following your patient along with you.       Sincerely,    Micah Valladares NP

## 2022-10-31 NOTE — PATIENT INSTRUCTIONS
Continue with your dietary changes it certainly has impacted your life in a very positive way    Continue to follow-up with all your specialists as appropriate    I renewed your Prozac with a 90-day supply for 1 year  For the Xanax I gave you 60 tablets without a refill since you are not really using it often and the refills will probably  by the time you need to get a refill so just ask your pharmacy to send that to us if needed      We will see back annually for med checks  You have any questions or concerns you can reach out by [place] in the interim we will try to address that as best we can if not we will bring you into the office for reevaluation      Office Policies      Appointments  Please make sure that you arrive for your next appointment at least 15 minutes prior to your appointment time. If for some reason you are going to be late please notify the office to determine if you need to be rescheduled or we can adjust your appointment time      Phone calls/patient messages:  Please allow up to 24 hours for someone in the office to contact you about your call or message. Be mindful your provider may be out of the office or your message may require further review. We encourage you to use [employer] for your messages as this is a faster, more efficient way to communicate with our office    Medication Refills:  Prescription medications require up to 48 business hours to process. We encourage you to use [employer] for your refills. For controlled medications: Please allow up to 72 business hours to process. Certain medications may require you to  a written prescription at our office. NO narcotic/controlled medications will be prescribed after 4pm Monday through Friday or on weekends    Form/Paperwork Completion:  We ask that you allow 7-14 business days. You may also download your forms to [employer] to have your doctor print off.

## 2022-10-31 NOTE — ASSESSMENT & PLAN NOTE
Well-controlled using herbal supplements and improve nutrition and anti-inflammatory diet  Ferritin levels appropriate as well as hemoglobin    We will continue to monitor over time

## 2022-10-31 NOTE — ASSESSMENT & PLAN NOTE
Fully thrombosed  Remains on chronic anticoagulation therapy secondary to cardiac issues and antiphospholipid antibody syndrome

## 2022-10-31 NOTE — PROGRESS NOTES
[address] 83  In Office FOLLOW-UP VISIT         Misty Wooten is a 46 y.o. female who presents today for the following:  Chief Complaint   Patient presents with    Stroke     Annual- pt denies any symptoms          ASSESSMENT AND PLAN  Patient is known to this practice. This is my first time seeing the patient. Chart and history reviewed in detail at today's office visit. 1. Anti-phospholipid antibody syndrome [facility])  Assessment & Plan:  Remains on chronic anticoagulation therapy managed through cardiology stable at this time  2. Vertebral artery dissection [facility])  Assessment & Plan:  Fully thrombosed  Remains on chronic anticoagulation therapy secondary to cardiac issues and antiphospholipid antibody syndrome  3. Cerebellar stroke [facility])  Assessment & Plan: Without recurrence  Remains on chronic anticoagulation therapy    4. Chronic anticoagulation  Comments:  Antiphospholipid antibody syndrome and mitral valve replacement  Assessment & Plan:   Metastatic cardiology  5. Anxiety  Assessment & Plan:  Much improved  and well-controlled continue on Prozac 20 mg daily and Xanax limited to 60 tablets/month. Patient presently taking 2 to 3 tablets/month at this time  Orders:  -     FLUoxetine (PROzac) 20 mg tablet; Take 1 Tablet by mouth daily. Indications: anxiousness associated with depression, Normal, Disp-90 Tablet, R-3Has appt 10- with Murtaza Sauceda NP, must be seen prior to further refills  -     ALPRAZolam (XANAX) 0.5 mg tablet; Take 1 Tablet by mouth two (2) times daily as needed for Anxiety. Max Daily Amount: 1 mg. Indications: anxious, Normal, Disp-60 Tablet, R-0This request is for a new prescription for a controlled substance as required by Federal/State law.   6. Restless leg syndrome, controlled  Assessment & Plan:   Well-controlled using herbal supplements and improve nutrition and anti-inflammatory diet  Ferritin levels appropriate as well as hemoglobin    We will continue to monitor over time  7. Primary fibromyalgia syndrome  Assessment & Plan:   Reasonably well-controlled using behavioral methods only along with improved diet and nutrition    Patient and/or family was given time to ask questions and voice concerns. I believe all questions concerns were adequately addressed at this  office visit. Patient and/or family also verbalized agreement and understanding of the above-stated plan    Patient remains a complex patient secondary to polypharmacy, significant comorbid conditions, and use of high-risk medications which complicate the decision making process related to patient's neurologic diagnosis          ICD-10-CM ICD-9-CM    1. Anti-phospholipid antibody syndrome (HCC)  D68.61 289.81       2. Vertebral artery dissection (HCC)  I77.74 443.24       3. Cerebellar stroke (HCC)  I63.9 434.91       4. Chronic anticoagulation  Z79.01 V58.61     Antiphospholipid antibody syndrome and mitral valve replacement      5. Anxiety  F41.9 300.00 FLUoxetine (PROzac) 20 mg tablet      ALPRAZolam (XANAX) 0.5 mg tablet      6. Restless leg syndrome, controlled  G25.81 333.94       7. Primary fibromyalgia syndrome  M79.7 729.1             I attest that 40 minutes was spent on today's visit reviewing medical records and diagnostic testing deemed pertinent to this patient's care, along with direct time spent at patient's visit including the history, physical assessment and plan, discussing diagnosis and management along with documentation.       HPI  Historical Data  Patient is known to the practice and was previously seen by Dr Leticia Cooper    Neurologic diagnosis  History of stroke/vertebral artery dissection secondary to thrombus [has antiphospholipid syndrome]  Cerebellar  Patient on long-term anticoagulation therapy    Restless legs    Other significant comorbid conditions/concerns  Depression and anxiety  Fibromyalgia  History of mitral valve replacement  Lupus anticoagulant and antiphospholipid syndrome  Chronic anticoagulation therapy  Autoimmune thyroid disease followed by endocrinology      Interim Data:     History of stroke/vertebral artery dissection secondary to antiphospholipid antibody syndrome on chronic anticoagulation therapy  Continues on long-term Coumadin managed by cardiology at this time secondary to mitral valve replacement    Without recurrence  No longer needs to be on Lipitor due to normal cholesterol panel        Restless leg syndrome  Using Kratom [herbal supplement]  Ferritin levels 163  Does not feels that she needs to be on any other medication at this time        Anxiety  Uses xanax: but using much less; using about 3x week  Using prozac daily  Overall feels as though things are really well controlled      Other  Overall patient feels as though she is significantly improved with making major dietary changes to include moving to a very strict anti-inflammatory diet which is helped pain as well as all other dimensions of her health      Pertinent diagnostic data        Results from Hospital Encounter encounter on 06/28/18    MRI PELV WO CONT    Impression  IMPRESSION:  1. Bilateral complex ovarian lesions that appear to contain blood products. While these could represent hemorrhagic cysts, endometrial implants would also  be possible. Contrast was not administered. Recommend follow-up ultrasound in  6-8 weeks. 2. The bone marrow signal is diffusely dark with sparing of the epiphyses. This  suggests extensive red marrow reconversion, likely related to increased  hematopoietic demand, possibly secondary to the patient's chronic pelvic  bleeding. Recommend correlation with the patient's laboratory studies. Allergies   Allergen Reactions    Erythromycin Nausea and Vomiting    Soy Swelling     aspiration       Current Outpatient Medications   Medication Sig    [place] Thyroid 120 mg tab Take 120 mg by mouth daily.     aspirin delayed-release 81 mg tablet Take 81 mg by mouth daily. FLUoxetine (PROzac) 20 mg tablet Take 1 Tablet by mouth daily. Indications: anxiousness associated with depression    ALPRAZolam (XANAX) 0.5 mg tablet Take 1 Tablet by mouth two (2) times daily as needed for Anxiety. Max Daily Amount: 1 mg. Indications: anxious    metoprolol tartrate (LOPRESSOR) 50 mg tablet Take 50 mg by mouth daily. warfarin (COUMADIN) 5 mg tablet Take 8 mg by mouth daily. No current facility-administered medications for this visit. Past medical history/surgical history, family history, and social history have been reviewed for today's visit      ROS    A ten system review of constitutional, cardiovascular, respiratory, musculoskeletal, endocrine, skin, SHEENT, genitourinary, psychiatric and neurologic systems was obtained and is unremarkable except as mentioned under HPI          EXAMINATION:     Visit Vitals  /82 (BP 1 Location: Left upper arm, BP Patient Position: Sitting, BP Cuff Size: Adult)   Pulse 70   Temp 97.3 °F (36.3 °C) (Temporal)   Resp 15   Ht 5' 2\" (1.575 m)   Wt 161 lb (73 kg)   SpO2 98%   BMI 29.45 kg/m²         General appearance: Patient is well-developed and well-nourished in no apparent distress and well groomed.     Psych/mental health:  Affect: Appropriate    PHQ  3 most recent PHQ Screens 10/31/2022   PHQ Not Done -   Little interest or pleasure in doing things Not at all   Feeling down, depressed, irritable, or hopeless Not at all   Total Score PHQ 2 0       HEENT:   Normocephalic  With evidence of trauma: No  Full range of motion head neck: Yes  Tenderness to palpation of the head neck region: No      Cardiovascular:     Extremities warm to touch: Yes  Extremity swelling: No  Discoloration: No  Evidence of PVD: No    Respiratory:   Dyspnea on exertion: No   Abnormal effort on casual observation: No   Use of portable oxygen: No   Evidence of cyanosis: No     Musculoskeletal:   Evidence of significant bone deformities: No Spinal curvature: No   Mild axial tenderness consistent with fibromyalgia    Integumentary:    Obvious bruising: No   Lacerations or discoloration on casual observation: No       Neurological Examination:   Mental Status:        MMSE  No flowsheet data found. Formal testing was not completed    there was nothing concerning on general observation and discussion. Alert oriented and appropriate to general conversation  Normal processing on general observation  Followed conversation and responded seemingly appropriate throughout the office visit  No word finding difficulties noted on casual observation  Able to follow directions without difficulty     Cranial Nerves:    EOMs intact gaze is conjugate  No nystagmus is appreciated  Facial motor intact bilaterally  Hearing intact to conversation  Voice with normal projection, no evidence of secretion pooling  Shoulder shrug intact bilaterally  No tongue deviation appreciated     Motor:   Normal bulk  No tremor appreciated on today's exam  No abnormal movements appreciated on today's exam  Moves extremities spontaneously and with purpose  5/5 x 4      Sensation: Intact to light touch    Coordination/Cerebellar:   Grossly intact    Gait: Ambulates independently with normal gait and station    Reflexes: Unremarkable    Fall risk assessment  No flowsheet data found. Follow-up and Dispositions    Return in about 1 year (around 10/31/2023) for In office appointment, med check.              Lamberto Lindsey MS, ANP-BC, [facility]

## 2022-10-31 NOTE — ASSESSMENT & PLAN NOTE
Much improved  and well-controlled continue on Prozac 20 mg daily and Xanax limited to 60 tablets/month.   Patient presently taking 2 to 3 tablets/month at this time

## 2022-10-31 NOTE — PROGRESS NOTES
1. Have you been to the ER, urgent care clinic since your last visit? Hospitalized since your last visit? No.    2. Have you seen or consulted any other health care providers outside of the [address] [place] [address] since your last visit? Include any pap smears or colon screening.    No.        Chief Complaint   Patient presents with    Stroke     Annual- pt denies any symptoms

## 2023-03-07 DIAGNOSIS — F41.9 ANXIETY: ICD-10-CM

## 2023-03-08 RX ORDER — ALPRAZOLAM 0.5 MG/1
TABLET ORAL
Qty: 60 TABLET | Refills: 0 | Status: SHIPPED | OUTPATIENT
Start: 2023-03-08

## 2023-12-10 DIAGNOSIS — F41.9 ANXIETY DISORDER, UNSPECIFIED: ICD-10-CM

## 2023-12-11 RX ORDER — FLUOXETINE 20 MG/1
TABLET, FILM COATED ORAL
Qty: 90 TABLET | Refills: 3 | OUTPATIENT
Start: 2023-12-11

## 2023-12-11 NOTE — TELEPHONE ENCOUNTER
Last office visit  10/31/2022  Nov10/31/2023 and was cancelled.  No future appointments scheduled  Last med refill 10/31/2022